# Patient Record
Sex: MALE | Race: WHITE | NOT HISPANIC OR LATINO | Employment: FULL TIME | URBAN - METROPOLITAN AREA
[De-identification: names, ages, dates, MRNs, and addresses within clinical notes are randomized per-mention and may not be internally consistent; named-entity substitution may affect disease eponyms.]

---

## 2018-03-23 ENCOUNTER — CONVERSION ENCOUNTER (OUTPATIENT)
Dept: INTERNAL MEDICINE | Facility: CLINIC | Age: 53
End: 2018-03-23

## 2018-03-23 ENCOUNTER — OFFICE VISIT CONVERTED (OUTPATIENT)
Dept: INTERNAL MEDICINE | Facility: CLINIC | Age: 53
End: 2018-03-23
Attending: INTERNAL MEDICINE

## 2018-09-28 ENCOUNTER — OFFICE VISIT CONVERTED (OUTPATIENT)
Dept: INTERNAL MEDICINE | Facility: CLINIC | Age: 53
End: 2018-09-28
Attending: INTERNAL MEDICINE

## 2019-03-29 ENCOUNTER — HOSPITAL ENCOUNTER (OUTPATIENT)
Dept: OTHER | Facility: HOSPITAL | Age: 54
Discharge: HOME | End: 2019-03-29
Attending: INTERNAL MEDICINE

## 2019-03-29 ENCOUNTER — CONVERSION ENCOUNTER (OUTPATIENT)
Dept: INTERNAL MEDICINE | Facility: CLINIC | Age: 54
End: 2019-03-29

## 2019-03-29 ENCOUNTER — OFFICE VISIT CONVERTED (OUTPATIENT)
Dept: INTERNAL MEDICINE | Facility: CLINIC | Age: 54
End: 2019-03-29
Attending: INTERNAL MEDICINE

## 2019-03-29 LAB
ALBUMIN SERPL-MCNC: 4.2 G/DL (ref 3.5–5)
ALBUMIN/GLOB SERPL: 1.4 {RATIO} (ref 1.4–2.6)
ALP SERPL-CCNC: 45 U/L (ref 56–119)
ALT SERPL-CCNC: 22 U/L (ref 10–40)
ANION GAP SERPL CALC-SCNC: 16 MMOL/L (ref 8–19)
AST SERPL-CCNC: 22 U/L (ref 15–50)
BASOPHILS # BLD AUTO: 0.05 10*3/UL (ref 0–0.2)
BASOPHILS NFR BLD AUTO: 0.8 % (ref 0–3)
BILIRUB SERPL-MCNC: 0.58 MG/DL (ref 0.2–1.3)
BUN SERPL-MCNC: 16 MG/DL (ref 5–25)
BUN/CREAT SERPL: 19 {RATIO} (ref 6–20)
CALCIUM SERPL-MCNC: 9.5 MG/DL (ref 8.7–10.4)
CHLORIDE SERPL-SCNC: 102 MMOL/L (ref 99–111)
CONV ABS IMM GRAN: 0.03 10*3/UL (ref 0–0.2)
CONV CO2: 26 MMOL/L (ref 22–32)
CONV IMMATURE GRAN: 0.5 % (ref 0–1.8)
CONV TOTAL PROTEIN: 7.3 G/DL (ref 6.3–8.2)
CREAT UR-MCNC: 0.83 MG/DL (ref 0.7–1.2)
DEPRECATED RDW RBC AUTO: 42.3 FL (ref 35.1–43.9)
EOSINOPHIL # BLD AUTO: 0.38 10*3/UL (ref 0–0.7)
EOSINOPHIL # BLD AUTO: 6.1 % (ref 0–7)
ERYTHROCYTE [DISTWIDTH] IN BLOOD BY AUTOMATED COUNT: 12.2 % (ref 11.6–14.4)
GFR SERPLBLD BASED ON 1.73 SQ M-ARVRAT: >60 ML/MIN/{1.73_M2}
GLOBULIN UR ELPH-MCNC: 3.1 G/DL (ref 2–3.5)
GLUCOSE SERPL-MCNC: 128 MG/DL (ref 70–99)
HBA1C MFR BLD: 15.6 G/DL (ref 14–18)
HCT VFR BLD AUTO: 44.7 % (ref 42–52)
LYMPHOCYTES # BLD AUTO: 1.31 10*3/UL (ref 1–5)
MCH RBC QN AUTO: 32.9 PG (ref 27–31)
MCHC RBC AUTO-ENTMCNC: 34.9 G/DL (ref 33–37)
MCV RBC AUTO: 94.3 FL (ref 80–96)
MONOCYTES # BLD AUTO: 0.52 10*3/UL (ref 0.2–1.2)
MONOCYTES NFR BLD AUTO: 8.4 % (ref 3–10)
NEUTROPHILS # BLD AUTO: 3.93 10*3/UL (ref 2–8)
NEUTROPHILS NFR BLD AUTO: 63.1 % (ref 30–85)
NRBC CBCN: 0 % (ref 0–0.7)
OSMOLALITY SERPL CALC.SUM OF ELEC: 293 MOSM/KG (ref 273–304)
PLATELET # BLD AUTO: 190 10*3/UL (ref 130–400)
PMV BLD AUTO: 11.6 FL (ref 9.4–12.4)
POTASSIUM SERPL-SCNC: 4.2 MMOL/L (ref 3.5–5.3)
RBC # BLD AUTO: 4.74 10*6/UL (ref 4.7–6.1)
SODIUM SERPL-SCNC: 140 MMOL/L (ref 135–147)
T4 FREE SERPL-MCNC: 1.2 NG/DL (ref 0.9–1.8)
TSH SERPL-ACNC: 3.05 M[IU]/L (ref 0.27–4.2)
VARIANT LYMPHS NFR BLD MANUAL: 21.1 % (ref 20–45)
WBC # BLD AUTO: 6.22 10*3/UL (ref 4.8–10.8)

## 2019-04-29 ENCOUNTER — OFFICE VISIT CONVERTED (OUTPATIENT)
Dept: UROLOGY | Facility: CLINIC | Age: 54
End: 2019-04-29
Attending: UROLOGY

## 2019-04-29 ENCOUNTER — CONVERSION ENCOUNTER (OUTPATIENT)
Dept: SURGERY | Facility: CLINIC | Age: 54
End: 2019-04-29

## 2019-05-31 ENCOUNTER — OFFICE VISIT CONVERTED (OUTPATIENT)
Dept: INTERNAL MEDICINE | Facility: CLINIC | Age: 54
End: 2019-05-31
Attending: INTERNAL MEDICINE

## 2019-06-07 ENCOUNTER — HOSPITAL ENCOUNTER (OUTPATIENT)
Dept: GENERAL RADIOLOGY | Facility: HOSPITAL | Age: 54
Discharge: HOME | End: 2019-06-07
Attending: UROLOGY

## 2019-06-07 LAB
CORTIS AM PEAK SERPL-MCNC: 1.1 UG/DL (ref 6–18.4)
CREAT BLD-MCNC: 0.9 MG/DL (ref 0.6–1.4)
GFR SERPLBLD BASED ON 1.73 SQ M-ARVRAT: >60 ML/MIN/{1.73_M2}

## 2019-06-13 LAB
METANEPH FREE SERPL-SCNC: 13 PG/ML (ref 0–62)
NORMETANEPHRINE, PL: 22 PG/ML (ref 0–145)

## 2019-09-06 ENCOUNTER — CONVERSION ENCOUNTER (OUTPATIENT)
Dept: INTERNAL MEDICINE | Facility: CLINIC | Age: 54
End: 2019-09-06

## 2019-09-06 ENCOUNTER — OFFICE VISIT CONVERTED (OUTPATIENT)
Dept: INTERNAL MEDICINE | Facility: CLINIC | Age: 54
End: 2019-09-06
Attending: INTERNAL MEDICINE

## 2019-11-01 ENCOUNTER — OFFICE VISIT CONVERTED (OUTPATIENT)
Dept: INTERNAL MEDICINE | Facility: CLINIC | Age: 54
End: 2019-11-01
Attending: INTERNAL MEDICINE

## 2019-11-01 ENCOUNTER — HOSPITAL ENCOUNTER (OUTPATIENT)
Dept: OTHER | Facility: HOSPITAL | Age: 54
Discharge: HOME | End: 2019-11-01
Attending: INTERNAL MEDICINE

## 2019-11-01 ENCOUNTER — CONVERSION ENCOUNTER (OUTPATIENT)
Dept: INTERNAL MEDICINE | Facility: CLINIC | Age: 54
End: 2019-11-01

## 2019-11-01 LAB
ALBUMIN SERPL-MCNC: 4.7 G/DL (ref 3.5–5)
ALBUMIN/GLOB SERPL: 1.8 {RATIO} (ref 1.4–2.6)
ALP SERPL-CCNC: 46 U/L (ref 56–119)
ALT SERPL-CCNC: 16 U/L (ref 10–40)
ANION GAP SERPL CALC-SCNC: 19 MMOL/L (ref 8–19)
AST SERPL-CCNC: 16 U/L (ref 15–50)
BASOPHILS # BLD AUTO: 0.05 10*3/UL (ref 0–0.2)
BASOPHILS NFR BLD AUTO: 0.6 % (ref 0–3)
BILIRUB SERPL-MCNC: 0.39 MG/DL (ref 0.2–1.3)
BUN SERPL-MCNC: 17 MG/DL (ref 5–25)
BUN/CREAT SERPL: 19 {RATIO} (ref 6–20)
CALCIUM SERPL-MCNC: 10 MG/DL (ref 8.7–10.4)
CHLORIDE SERPL-SCNC: 103 MMOL/L (ref 99–111)
CHOLEST SERPL-MCNC: 168 MG/DL (ref 107–200)
CHOLEST/HDLC SERPL: 4.7 {RATIO} (ref 3–6)
CONV ABS IMM GRAN: 0.06 10*3/UL (ref 0–0.2)
CONV CO2: 25 MMOL/L (ref 22–32)
CONV IMMATURE GRAN: 0.7 % (ref 0–1.8)
CONV TOTAL PROTEIN: 7.3 G/DL (ref 6.3–8.2)
CREAT UR-MCNC: 0.89 MG/DL (ref 0.7–1.2)
DEPRECATED RDW RBC AUTO: 42.3 FL (ref 35.1–43.9)
EOSINOPHIL # BLD AUTO: 0.29 10*3/UL (ref 0–0.7)
EOSINOPHIL # BLD AUTO: 3.3 % (ref 0–7)
ERYTHROCYTE [DISTWIDTH] IN BLOOD BY AUTOMATED COUNT: 12.1 % (ref 11.6–14.4)
GFR SERPLBLD BASED ON 1.73 SQ M-ARVRAT: >60 ML/MIN/{1.73_M2}
GLOBULIN UR ELPH-MCNC: 2.6 G/DL (ref 2–3.5)
GLUCOSE SERPL-MCNC: 99 MG/DL (ref 70–99)
HCT VFR BLD AUTO: 42.3 % (ref 42–52)
HDLC SERPL-MCNC: 36 MG/DL (ref 40–60)
HGB BLD-MCNC: 14.6 G/DL (ref 14–18)
LDLC SERPL CALC-MCNC: 107 MG/DL (ref 70–100)
LYMPHOCYTES # BLD AUTO: 1.45 10*3/UL (ref 1–5)
LYMPHOCYTES NFR BLD AUTO: 16.7 % (ref 20–45)
MCH RBC QN AUTO: 32.7 PG (ref 27–31)
MCHC RBC AUTO-ENTMCNC: 34.5 G/DL (ref 33–37)
MCV RBC AUTO: 94.8 FL (ref 80–96)
MONOCYTES # BLD AUTO: 0.64 10*3/UL (ref 0.2–1.2)
MONOCYTES NFR BLD AUTO: 7.4 % (ref 3–10)
NEUTROPHILS # BLD AUTO: 6.19 10*3/UL (ref 2–8)
NEUTROPHILS NFR BLD AUTO: 71.3 % (ref 30–85)
NRBC CBCN: 0 % (ref 0–0.7)
OSMOLALITY SERPL CALC.SUM OF ELEC: 296 MOSM/KG (ref 273–304)
PLATELET # BLD AUTO: 210 10*3/UL (ref 130–400)
PMV BLD AUTO: 11.6 FL (ref 9.4–12.4)
POTASSIUM SERPL-SCNC: 4.6 MMOL/L (ref 3.5–5.3)
RBC # BLD AUTO: 4.46 10*6/UL (ref 4.7–6.1)
SODIUM SERPL-SCNC: 142 MMOL/L (ref 135–147)
T4 FREE SERPL-MCNC: 1 NG/DL (ref 0.9–1.8)
TRIGL SERPL-MCNC: 124 MG/DL (ref 40–150)
TSH SERPL-ACNC: 3.6 M[IU]/L (ref 0.27–4.2)
VLDLC SERPL-MCNC: 25 MG/DL (ref 5–37)
WBC # BLD AUTO: 8.68 10*3/UL (ref 4.8–10.8)

## 2020-07-20 ENCOUNTER — HOSPITAL ENCOUNTER (OUTPATIENT)
Dept: OTHER | Facility: HOSPITAL | Age: 55
Discharge: HOME | End: 2020-07-20
Attending: INTERNAL MEDICINE

## 2020-07-20 ENCOUNTER — OFFICE VISIT CONVERTED (OUTPATIENT)
Dept: INTERNAL MEDICINE | Facility: CLINIC | Age: 55
End: 2020-07-20
Attending: NURSE PRACTITIONER

## 2020-07-20 LAB — T4 FREE SERPL-MCNC: 1.2 NG/DL (ref 0.9–1.8)

## 2020-07-21 LAB
ALBUMIN SERPL-MCNC: 4.5 G/DL (ref 3.5–5)
ALBUMIN/GLOB SERPL: 1.7 {RATIO} (ref 1.4–2.6)
ALP SERPL-CCNC: 39 U/L (ref 56–119)
ALT SERPL-CCNC: 15 U/L (ref 10–40)
ANION GAP SERPL CALC-SCNC: 23 MMOL/L (ref 8–19)
AST SERPL-CCNC: 18 U/L (ref 15–50)
BASOPHILS # BLD AUTO: 0.07 10*3/UL (ref 0–0.2)
BASOPHILS NFR BLD AUTO: 1.1 % (ref 0–3)
BILIRUB SERPL-MCNC: 0.45 MG/DL (ref 0.2–1.3)
BUN SERPL-MCNC: 27 MG/DL (ref 5–25)
BUN/CREAT SERPL: 26 {RATIO} (ref 6–20)
CALCIUM SERPL-MCNC: 9.6 MG/DL (ref 8.7–10.4)
CHLORIDE SERPL-SCNC: 110 MMOL/L (ref 99–111)
CHOLEST SERPL-MCNC: 154 MG/DL (ref 107–200)
CHOLEST/HDLC SERPL: 4.3 {RATIO} (ref 3–6)
CONV ABS IMM GRAN: 0.04 10*3/UL (ref 0–0.2)
CONV CO2: 16 MMOL/L (ref 22–32)
CONV IMMATURE GRAN: 0.6 % (ref 0–1.8)
CONV TOTAL PROTEIN: 7.2 G/DL (ref 6.3–8.2)
CREAT UR-MCNC: 1.04 MG/DL (ref 0.7–1.2)
DEPRECATED RDW RBC AUTO: 44.5 FL (ref 35.1–43.9)
EOSINOPHIL # BLD AUTO: 0.49 10*3/UL (ref 0–0.7)
EOSINOPHIL # BLD AUTO: 7.4 % (ref 0–7)
ERYTHROCYTE [DISTWIDTH] IN BLOOD BY AUTOMATED COUNT: 12.2 % (ref 11.6–14.4)
GFR SERPLBLD BASED ON 1.73 SQ M-ARVRAT: >60 ML/MIN/{1.73_M2}
GLOBULIN UR ELPH-MCNC: 2.7 G/DL (ref 2–3.5)
GLUCOSE SERPL-MCNC: 111 MG/DL (ref 70–99)
HCT VFR BLD AUTO: 39 % (ref 42–52)
HDLC SERPL-MCNC: 36 MG/DL (ref 40–60)
HGB BLD-MCNC: 13.2 G/DL (ref 14–18)
LDLC SERPL CALC-MCNC: 94 MG/DL (ref 70–100)
LYMPHOCYTES # BLD AUTO: 1.51 10*3/UL (ref 1–5)
LYMPHOCYTES NFR BLD AUTO: 22.9 % (ref 20–45)
MCH RBC QN AUTO: 33.4 PG (ref 27–31)
MCHC RBC AUTO-ENTMCNC: 33.8 G/DL (ref 33–37)
MCV RBC AUTO: 98.7 FL (ref 80–96)
MONOCYTES # BLD AUTO: 0.51 10*3/UL (ref 0.2–1.2)
MONOCYTES NFR BLD AUTO: 7.7 % (ref 3–10)
NEUTROPHILS # BLD AUTO: 3.97 10*3/UL (ref 2–8)
NEUTROPHILS NFR BLD AUTO: 60.3 % (ref 30–85)
NRBC CBCN: 0 % (ref 0–0.7)
OSMOLALITY SERPL CALC.SUM OF ELEC: 302 MOSM/KG (ref 273–304)
PLATELET # BLD AUTO: 211 10*3/UL (ref 130–400)
PMV BLD AUTO: 11.9 FL (ref 9.4–12.4)
POTASSIUM SERPL-SCNC: 5.6 MMOL/L (ref 3.5–5.3)
PSA SERPL-MCNC: 0.86 NG/ML (ref 0–4)
RBC # BLD AUTO: 3.95 10*6/UL (ref 4.7–6.1)
SODIUM SERPL-SCNC: 143 MMOL/L (ref 135–147)
TRIGL SERPL-MCNC: 121 MG/DL (ref 40–150)
TSH SERPL-ACNC: 4.25 M[IU]/L (ref 0.27–4.2)
VLDLC SERPL-MCNC: 24 MG/DL (ref 5–37)
WBC # BLD AUTO: 6.59 10*3/UL (ref 4.8–10.8)

## 2020-07-24 ENCOUNTER — HOSPITAL ENCOUNTER (OUTPATIENT)
Dept: OTHER | Facility: HOSPITAL | Age: 55
Discharge: HOME | End: 2020-07-24
Attending: NURSE PRACTITIONER

## 2020-07-24 LAB — VIT B12 SERPL-MCNC: 564 PG/ML (ref 211–911)

## 2020-07-25 LAB
EST. AVERAGE GLUCOSE BLD GHB EST-MCNC: 105 MG/DL
HBA1C MFR BLD: 5.3 % (ref 3.5–5.7)
IRON SATN MFR SERPL: 38 % (ref 20–55)
IRON SERPL-MCNC: 132 UG/DL (ref 70–180)
TIBC SERPL-MCNC: 346 UG/DL (ref 245–450)
TRANSFERRIN SERPL-MCNC: 242 MG/DL (ref 215–365)

## 2020-08-08 ENCOUNTER — HOSPITAL ENCOUNTER (OUTPATIENT)
Dept: OTHER | Facility: HOSPITAL | Age: 55
Discharge: HOME | End: 2020-08-08
Attending: NURSE PRACTITIONER

## 2020-08-08 LAB
BASOPHILS # BLD AUTO: 0.06 10*3/UL (ref 0–0.2)
BASOPHILS NFR BLD AUTO: 1 % (ref 0–3)
CONV ABS IMM GRAN: 0.04 10*3/UL (ref 0–0.2)
CONV IMMATURE GRAN: 0.6 % (ref 0–1.8)
DEPRECATED RDW RBC AUTO: 44.7 FL (ref 35.1–43.9)
EOSINOPHIL # BLD AUTO: 0.35 10*3/UL (ref 0–0.7)
EOSINOPHIL # BLD AUTO: 5.6 % (ref 0–7)
ERYTHROCYTE [DISTWIDTH] IN BLOOD BY AUTOMATED COUNT: 12.9 % (ref 11.6–14.4)
HCT VFR BLD AUTO: 35.6 % (ref 42–52)
HGB BLD-MCNC: 12 G/DL (ref 14–18)
LYMPHOCYTES # BLD AUTO: 1.46 10*3/UL (ref 1–5)
LYMPHOCYTES NFR BLD AUTO: 23.3 % (ref 20–45)
MCH RBC QN AUTO: 32.6 PG (ref 27–31)
MCHC RBC AUTO-ENTMCNC: 33.7 G/DL (ref 33–37)
MCV RBC AUTO: 96.7 FL (ref 80–96)
MONOCYTES # BLD AUTO: 0.57 10*3/UL (ref 0.2–1.2)
MONOCYTES NFR BLD AUTO: 9.1 % (ref 3–10)
NEUTROPHILS # BLD AUTO: 3.79 10*3/UL (ref 2–8)
NEUTROPHILS NFR BLD AUTO: 60.4 % (ref 30–85)
NRBC CBCN: 0 % (ref 0–0.7)
PLATELET # BLD AUTO: 195 10*3/UL (ref 130–400)
PMV BLD AUTO: 10.6 FL (ref 9.4–12.4)
RBC # BLD AUTO: 3.68 10*6/UL (ref 4.7–6.1)
T4 FREE SERPL-MCNC: 1 NG/DL (ref 0.9–1.8)
TSH SERPL-ACNC: 4.16 M[IU]/L (ref 0.27–4.2)
WBC # BLD AUTO: 6.27 10*3/UL (ref 4.8–10.8)

## 2021-05-13 NOTE — PROGRESS NOTES
"   Progress Note      Patient Name: Edgar Owens   Patient ID: [id number]   Sex: Male   YOB: 1965    Primary Care Provider: Jennifer Lyon MD   Referring Provider: Jennifer Lyon MD    Visit Date: July 20, 2020    Provider: ALFREDA Domingo   Location: [facility]   Location Address: [address] [place] [address]  [id number]   Location Phone: [phone]          Chief Complaint  · \"I am just following up on my blood pressure\"  · \"I want to talk about the supplements I have been taking\"      History Of Present Illness  Edgar Owens is a 54 year old /White male who presents for evaluation and treatment of:      Follow-up visit    Chronic management    Hypertension doing really well, 1teens-120/70-80s. Patient reports that he is taking his blood pressure once a day. Denies cp, shortness of breath, edema. 126/64 manal pressure.     HypothyroidismSynthroid 75 mcg. Denies hypo/hyper symptoms at this time.     Colonoscopy-  sent last year, patient up to date: seen  thinks it was up to date until 2022.     Labs last November.     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 Hydrating with water but no electrolyte replacements.     Has been taking 's bionutients as some natural replacements. Patient is taking fish oil, probiotics, chelated magnesium, d-ribose, CoQ10 and miltiple vitamin and mineral powder.       Past Medical History  Disease Name Date Onset Notes   Hypertension --  --    Hypothyroidism --  --    Reflux --  --          Past Surgical History  Procedure Name Date Notes   Adrenalectomy --  --    Hemorrhoidectomy --  --          Medication List  Name Date Started Instructions   Co Q-10 oral  --    D-Ribose  --    Fish Oil oral  --    lisinopril 40 mg oral tablet 07/20/2020 take 1 tablet (40 mg) by oral route once daily for 90 days   magnesium 250 mg oral tablet  take 1 tablet by oral route " "daily   Multiple Vitamin and Mineral Powder  --    Probiotic oral  --    Synthroid 75 mcg oral tablet 07/20/2020 TAKE ONE TABLET BY MOUTH DAILY         Allergy List  Allergen Name Date Reaction Notes   NO KNOWN DRUG ALLERGIES --  --  --        Allergies Reconciled  Family Medical History  Disease Name Relative/Age Notes   Diabetes, unspecified type Father/  Mother/   Mother; Father   Prostate cancer  Uncle (maternal)   Family history of colon cancer Mother/50s   Mother/50s         Social History  Finding Status Start/Stop Quantity Notes   Alcohol Current some day 0/0 --  drinks weekly; beer   Caffeine Current every day 0/0 --  drinks regularly; 3-4 times per day   Tobacco Former 20/0 --  07/08/2016 - Stopped smoking march 2016 current everyday smoker; started smoking at age 18; smoked 20 cigarette(s) per day         Review of Systems  · Constitutional  o Denies  o : fever, fatigue, weight loss, weight gain  · HENT  o Denies  o : headaches, sinus pain, nasal congestion, nasal discharge  · Cardiovascular  o Denies  o : lower extremity edema, claudication, chest pressure, palpitations  · Respiratory  o Denies  o : shortness of breath, wheezing, frequent cough, hemoptysis, dyspnea on exertion  · Gastrointestinal  o Denies  o : nausea, vomiting, diarrhea, constipation, abdominal pain  · Neurologic  o Admits  o : dizziness  o Denies  o : altered mental status, muscular weakness, incoordination  · Endocrine  o Denies  o : loss of hair, constipation, cold intolerance, heat intolerance, weight gain, weight loss      Vitals  Date Time BP Position Site L\R Cuff Size HR RR TEMP (F) WT  HT  BMI kg/m2 BSA m2 O2 Sat HC       09/06/2019 09:11 /82 Sitting    75 - R  97.7 197lbs 6oz 5'  4\" 33.88 2.01 98 %    09/06/2019 10:05 /85 Sitting               11/01/2019 10:15 /84 Sitting    83 - R  98 198lbs 2oz 5'  4\" 34.01 2.01 98 %    07/20/2020 09:24 /80 Sitting    96 - R  98 194lbs 8oz 5'  4\" 33.39 2 97 %  "         Physical Examination  · Constitutional  o Appearance  o : no acute distress, well-nourished  · Head and Face  o Head  o :   § Inspection  § : atraumatic, normocephalic  · Eyes  o Eyes  o : extraocular movements intact, no scleral icterus, no conjunctival injection  · Ears, Nose, Mouth and Throat  o Ears  o :   § External Ears  § : normal  § Otoscopic Examination  § : tympanic membrane appearance within normal limits bilaterally  o Nose  o :   § Intranasal Exam  § : nares patent  o Oral Cavity  o :   § Oral Mucosa  § : moist mucous membranes  o Throat  o :   § Oropharynx  § : no inflammation or lesions present, tonsils within normal limits  · Neck  o Thyroid  o : gland size normal, nontender, no nodules or masses present on palpation, symmetric  · Respiratory  o Respiratory Effort  o : breathing comfortably, symmetric chest rise  o Auscultation of Lungs  o : clear to asculatation bilaterally, no wheezes, rales, or rhonchii  · Cardiovascular  o Heart  o :   § Auscultation of Heart  § : regular rate and rhythm, no murmurs, rubs, or gallops  o Peripheral Vascular System  o :   § Extremities  § : no edema  · Gastrointestinal  o Abdominal Examination  o :   § Abdomen  § : bowel sounds present, non-distended, non-tender  · Lymphatic  o Neck  o : no lymphadenopathy present  o Supraclavicular Nodes  o : no supraclavicular nodes  · Skin and Subcutaneous Tissue  o General Inspection  o : no lesions present, no areas of discoloration, skin turgor normal  · Neurologic  o Mental Status Examination  o :   § Orientation  § : grossly oriented to person, place and time  o Gait and Station  o :   § Gait Screening  § : normal gait  · Psychiatric  o General  o : normal mood and affect          Assessment  · Hypertension     401.9/I10  Well-controlled at this time. Continue current regimen. Continue taking blood pressure and write it down bring a blood pressure log with him to the next  visit.  · Reflux     530.81/K21.9  · Hypothyroidism     244.9/E03.9  Will check labs at this time.  · Screening for depression     V79.0/Z13.89  PHQ 9 score 0.  · Screening for lipid disorders     V77.91/Z13.220  Will check labs.  · Screening for colon cancer     V76.51/Z12.11  Patient believes he has had his colonoscopy performed will try to obtain records from Dr. Saldana.    Problems Reconciled  Plan  · Orders  o ACO-18: Negative screen for clinical depression using a standardized tool () - V79.0/Z13.89 - 07/20/2020   PHQ9: score 0   o Male Physical Primary Care Panel (CMP, CBC, TSH, Lipid, PSA) [facility] (87591, 17026, 33272, 59768, 85574, ) - 530.81/K21.9, 401.9/I10, 244.9/E03.9, V77.91/Z13.220 - 07/20/2020   fasting at this time.    Can leave voicemail with lab results   o Thyroid Profile (48564, 89873, THYII) - 244.9/E03.9 - 07/20/2020  o ACO-39: Current medications updated and reviewed () - - 07/20/2020  o ACO-18: Negative screen for clinical depression using a standardized tool () - - 07/20/2020  · Medications  o lisinopril 40 mg oral tablet   SIG: take 1 tablet (40 mg) by oral route once daily for 90 days   DISP: (90) tablets with 1 refills  Refilled on 07/20/2020     o Synthroid 75 mcg oral tablet   SIG: TAKE ONE TABLET BY MOUTH DAILY   DISP: (90) Tablet with 1 refills  Refilled on 07/20/2020     o Medications have been Reconciled  o Transition of Care or Provider Policy  · Instructions  o Depression Screen completed and scanned into the EMR under the designated folder within the patient's documents.  o Today's PHQ-9 result is _0__  o Patient was educated/instructed on their diagnosis, treatment and medications prior to discharge from the clinic today.  o Call the office with any concerns or questions.  · Disposition  o Call or Return if symptoms worsen or persist.  o Meds sent to pharmacy  o 6 month follow up  o Dr. Lyon patient            Electronically Signed by: ALFREDA Domingo  -Author on July 20, 2020 10:52:47 AM

## 2021-05-15 VITALS
DIASTOLIC BLOOD PRESSURE: 82 MMHG | HEIGHT: 64 IN | SYSTOLIC BLOOD PRESSURE: 150 MMHG | HEART RATE: 75 BPM | BODY MASS INDEX: 33.7 KG/M2 | SYSTOLIC BLOOD PRESSURE: 148 MMHG | OXYGEN SATURATION: 98 % | TEMPERATURE: 97.7 F | WEIGHT: 197.37 LBS | DIASTOLIC BLOOD PRESSURE: 85 MMHG

## 2021-05-15 VITALS
HEIGHT: 64 IN | HEART RATE: 70 BPM | WEIGHT: 200 LBS | BODY MASS INDEX: 34.15 KG/M2 | BODY MASS INDEX: 34.19 KG/M2 | HEIGHT: 64 IN | RESPIRATION RATE: 16 BRPM | WEIGHT: 200.25 LBS

## 2021-05-15 VITALS
BODY MASS INDEX: 33.83 KG/M2 | HEART RATE: 83 BPM | TEMPERATURE: 98 F | HEIGHT: 64 IN | SYSTOLIC BLOOD PRESSURE: 148 MMHG | DIASTOLIC BLOOD PRESSURE: 84 MMHG | WEIGHT: 198.12 LBS | OXYGEN SATURATION: 98 %

## 2021-05-15 VITALS
RESPIRATION RATE: 16 BRPM | DIASTOLIC BLOOD PRESSURE: 88 MMHG | OXYGEN SATURATION: 99 % | HEIGHT: 64 IN | HEART RATE: 92 BPM | TEMPERATURE: 97.9 F | BODY MASS INDEX: 34.15 KG/M2 | WEIGHT: 200 LBS | SYSTOLIC BLOOD PRESSURE: 151 MMHG

## 2021-05-15 VITALS
HEIGHT: 64 IN | WEIGHT: 197 LBS | SYSTOLIC BLOOD PRESSURE: 156 MMHG | HEART RATE: 88 BPM | DIASTOLIC BLOOD PRESSURE: 88 MMHG | RESPIRATION RATE: 16 BRPM | OXYGEN SATURATION: 99 % | TEMPERATURE: 97.9 F | BODY MASS INDEX: 33.63 KG/M2

## 2021-05-15 VITALS
HEIGHT: 64 IN | SYSTOLIC BLOOD PRESSURE: 132 MMHG | HEART RATE: 96 BPM | WEIGHT: 194.5 LBS | OXYGEN SATURATION: 97 % | BODY MASS INDEX: 33.2 KG/M2 | TEMPERATURE: 98 F | DIASTOLIC BLOOD PRESSURE: 80 MMHG

## 2021-05-16 VITALS
HEIGHT: 64 IN | BODY MASS INDEX: 33.89 KG/M2 | WEIGHT: 198.5 LBS | OXYGEN SATURATION: 100 % | SYSTOLIC BLOOD PRESSURE: 150 MMHG | RESPIRATION RATE: 16 BRPM | DIASTOLIC BLOOD PRESSURE: 90 MMHG | HEART RATE: 88 BPM | TEMPERATURE: 97.2 F

## 2021-05-16 VITALS
DIASTOLIC BLOOD PRESSURE: 76 MMHG | SYSTOLIC BLOOD PRESSURE: 136 MMHG | TEMPERATURE: 97.7 F | BODY MASS INDEX: 34.91 KG/M2 | HEIGHT: 64 IN | RESPIRATION RATE: 16 BRPM | OXYGEN SATURATION: 98 % | WEIGHT: 204.5 LBS | HEART RATE: 98 BPM

## 2021-07-13 RX ORDER — LISINOPRIL 40 MG/1
TABLET ORAL
COMMUNITY
Start: 2021-01-25 | End: 2021-07-13 | Stop reason: SDUPTHER

## 2021-07-13 RX ORDER — LEVOTHYROXINE SODIUM 0.07 MG/1
TABLET ORAL
COMMUNITY
Start: 2021-01-25 | End: 2021-07-13 | Stop reason: SDUPTHER

## 2021-07-13 RX ORDER — LEVOTHYROXINE SODIUM 0.07 MG/1
75 TABLET ORAL DAILY
Qty: 90 TABLET | Refills: 0 | Status: SHIPPED | OUTPATIENT
Start: 2021-07-13 | End: 2021-11-02 | Stop reason: SDUPTHER

## 2021-07-13 RX ORDER — LISINOPRIL 40 MG/1
40 TABLET ORAL DAILY
Qty: 90 TABLET | Refills: 0 | Status: SHIPPED | OUTPATIENT
Start: 2021-07-13 | End: 2021-11-02

## 2021-10-22 ENCOUNTER — TELEPHONE (OUTPATIENT)
Dept: INTERNAL MEDICINE | Facility: CLINIC | Age: 56
End: 2021-10-22

## 2021-11-02 ENCOUNTER — OFFICE VISIT (OUTPATIENT)
Dept: INTERNAL MEDICINE | Facility: CLINIC | Age: 56
End: 2021-11-02

## 2021-11-02 VITALS
WEIGHT: 191 LBS | TEMPERATURE: 98.6 F | SYSTOLIC BLOOD PRESSURE: 152 MMHG | DIASTOLIC BLOOD PRESSURE: 90 MMHG | BODY MASS INDEX: 32.61 KG/M2 | RESPIRATION RATE: 14 BRPM | OXYGEN SATURATION: 97 % | HEART RATE: 96 BPM | HEIGHT: 64 IN

## 2021-11-02 DIAGNOSIS — Z11.59 NEED FOR HEPATITIS C SCREENING TEST: ICD-10-CM

## 2021-11-02 DIAGNOSIS — E03.8 HYPOTHYROIDISM DUE TO HASHIMOTO'S THYROIDITIS: ICD-10-CM

## 2021-11-02 DIAGNOSIS — Z12.5 SCREENING PSA (PROSTATE SPECIFIC ANTIGEN): ICD-10-CM

## 2021-11-02 DIAGNOSIS — Z13.220 SCREENING, LIPID: Primary | ICD-10-CM

## 2021-11-02 DIAGNOSIS — Z71.85 VACCINE COUNSELING: ICD-10-CM

## 2021-11-02 DIAGNOSIS — I10 PRIMARY HYPERTENSION: ICD-10-CM

## 2021-11-02 DIAGNOSIS — E06.3 HYPOTHYROIDISM DUE TO HASHIMOTO'S THYROIDITIS: ICD-10-CM

## 2021-11-02 PROBLEM — E03.9 HYPOTHYROIDISM: Status: ACTIVE | Noted: 2021-11-02

## 2021-11-02 PROBLEM — K21.9 ESOPHAGEAL REFLUX: Status: ACTIVE | Noted: 2021-11-02

## 2021-11-02 LAB
ALBUMIN SERPL-MCNC: 4.5 G/DL (ref 3.5–5.2)
ALBUMIN/GLOB SERPL: 1.8 G/DL
ALP SERPL-CCNC: 44 U/L (ref 39–117)
ALT SERPL W P-5'-P-CCNC: 16 U/L (ref 1–41)
ANION GAP SERPL CALCULATED.3IONS-SCNC: 5.7 MMOL/L (ref 5–15)
AST SERPL-CCNC: 16 U/L (ref 1–40)
BASOPHILS # BLD AUTO: 0.06 10*3/MM3 (ref 0–0.2)
BASOPHILS NFR BLD AUTO: 1 % (ref 0–1.5)
BILIRUB SERPL-MCNC: 0.6 MG/DL (ref 0–1.2)
BUN SERPL-MCNC: 16 MG/DL (ref 6–20)
BUN/CREAT SERPL: 16 (ref 7–25)
CALCIUM SPEC-SCNC: 9.8 MG/DL (ref 8.6–10.5)
CHLORIDE SERPL-SCNC: 105 MMOL/L (ref 98–107)
CHOLEST SERPL-MCNC: 190 MG/DL (ref 0–200)
CO2 SERPL-SCNC: 28.3 MMOL/L (ref 22–29)
CREAT SERPL-MCNC: 1 MG/DL (ref 0.76–1.27)
DEPRECATED RDW RBC AUTO: 41.8 FL (ref 37–54)
EOSINOPHIL # BLD AUTO: 0.31 10*3/MM3 (ref 0–0.4)
EOSINOPHIL NFR BLD AUTO: 5.4 % (ref 0.3–6.2)
ERYTHROCYTE [DISTWIDTH] IN BLOOD BY AUTOMATED COUNT: 12.1 % (ref 12.3–15.4)
GFR SERPL CREATININE-BSD FRML MDRD: 77 ML/MIN/1.73
GLOBULIN UR ELPH-MCNC: 2.5 GM/DL
GLUCOSE SERPL-MCNC: 92 MG/DL (ref 65–99)
HCT VFR BLD AUTO: 43.4 % (ref 37.5–51)
HDLC SERPL-MCNC: 45 MG/DL (ref 40–60)
HGB BLD-MCNC: 15.1 G/DL (ref 13–17.7)
IMM GRANULOCYTES # BLD AUTO: 0.03 10*3/MM3 (ref 0–0.05)
IMM GRANULOCYTES NFR BLD AUTO: 0.5 % (ref 0–0.5)
LDLC SERPL CALC-MCNC: 129 MG/DL (ref 0–100)
LDLC/HDLC SERPL: 2.83 {RATIO}
LYMPHOCYTES # BLD AUTO: 1.5 10*3/MM3 (ref 0.7–3.1)
LYMPHOCYTES NFR BLD AUTO: 26.2 % (ref 19.6–45.3)
MCH RBC QN AUTO: 33.1 PG (ref 26.6–33)
MCHC RBC AUTO-ENTMCNC: 34.8 G/DL (ref 31.5–35.7)
MCV RBC AUTO: 95.2 FL (ref 79–97)
MONOCYTES # BLD AUTO: 0.56 10*3/MM3 (ref 0.1–0.9)
MONOCYTES NFR BLD AUTO: 9.8 % (ref 5–12)
NEUTROPHILS NFR BLD AUTO: 3.27 10*3/MM3 (ref 1.7–7)
NEUTROPHILS NFR BLD AUTO: 57.1 % (ref 42.7–76)
NRBC BLD AUTO-RTO: 0 /100 WBC (ref 0–0.2)
PLATELET # BLD AUTO: 181 10*3/MM3 (ref 140–450)
PMV BLD AUTO: 11.1 FL (ref 6–12)
POTASSIUM SERPL-SCNC: 4.6 MMOL/L (ref 3.5–5.2)
PROT SERPL-MCNC: 7 G/DL (ref 6–8.5)
PSA SERPL-MCNC: 1.12 NG/ML (ref 0–4)
RBC # BLD AUTO: 4.56 10*6/MM3 (ref 4.14–5.8)
SODIUM SERPL-SCNC: 139 MMOL/L (ref 136–145)
T4 FREE SERPL-MCNC: 1.25 NG/DL (ref 0.93–1.7)
TRIGL SERPL-MCNC: 88 MG/DL (ref 0–150)
TSH SERPL DL<=0.05 MIU/L-ACNC: 4.32 UIU/ML (ref 0.27–4.2)
VLDLC SERPL-MCNC: 16 MG/DL (ref 5–40)
WBC # BLD AUTO: 5.73 10*3/MM3 (ref 3.4–10.8)

## 2021-11-02 PROCEDURE — 85025 COMPLETE CBC W/AUTO DIFF WBC: CPT | Performed by: INTERNAL MEDICINE

## 2021-11-02 PROCEDURE — 80061 LIPID PANEL: CPT | Performed by: INTERNAL MEDICINE

## 2021-11-02 PROCEDURE — 80053 COMPREHEN METABOLIC PANEL: CPT | Performed by: INTERNAL MEDICINE

## 2021-11-02 PROCEDURE — 84439 ASSAY OF FREE THYROXINE: CPT | Performed by: INTERNAL MEDICINE

## 2021-11-02 PROCEDURE — 99214 OFFICE O/P EST MOD 30 MIN: CPT | Performed by: INTERNAL MEDICINE

## 2021-11-02 PROCEDURE — 36415 COLL VENOUS BLD VENIPUNCTURE: CPT | Performed by: INTERNAL MEDICINE

## 2021-11-02 PROCEDURE — 86803 HEPATITIS C AB TEST: CPT | Performed by: INTERNAL MEDICINE

## 2021-11-02 PROCEDURE — G0103 PSA SCREENING: HCPCS | Performed by: INTERNAL MEDICINE

## 2021-11-02 PROCEDURE — 84443 ASSAY THYROID STIM HORMONE: CPT | Performed by: INTERNAL MEDICINE

## 2021-11-02 RX ORDER — TERBINAFINE HYDROCHLORIDE 250 MG/1
250 TABLET ORAL DAILY
Qty: 90 TABLET | Refills: 0 | Status: SHIPPED | OUTPATIENT
Start: 2021-11-02 | End: 2022-01-14

## 2021-11-02 RX ORDER — LEVOTHYROXINE SODIUM 0.07 MG/1
75 TABLET ORAL DAILY
Qty: 90 TABLET | Refills: 0 | Status: SHIPPED | OUTPATIENT
Start: 2021-11-02 | End: 2022-01-14 | Stop reason: SDUPTHER

## 2021-11-02 RX ORDER — TELMISARTAN AND HYDROCHLORTHIAZIDE 80; 12.5 MG/1; MG/1
1 TABLET ORAL DAILY
Qty: 90 TABLET | Refills: 1 | Status: SHIPPED | OUTPATIENT
Start: 2021-11-02 | End: 2022-04-27 | Stop reason: SDUPTHER

## 2021-11-02 RX ORDER — IBUPROFEN 800 MG/1
800 TABLET ORAL EVERY 6 HOURS PRN
Qty: 90 TABLET | Refills: 0 | Status: SHIPPED | OUTPATIENT
Start: 2021-11-02

## 2021-11-02 NOTE — PROGRESS NOTES
"Chief Complaint  Follow-up and Hypothyroidism    Subjective          Edgar Owens presents to [facility]  History of Present Illness    HTN-  Hasn't really been checking bp at home  Has noticed blurry vision at time, usually in the evenings  No chest pain  No trouble breathing    Still taking thyroid meds and feels well on this    Left index finger  Not sure if he hit it on something, but states that it is possible  Nail is bent and discolored    Objective   Vital Signs:   /90   Pulse 96   Temp 98.6 °F (37 °C)   Resp 14   Ht 162.6 cm (64\")   Wt 86.6 kg (191 lb)   SpO2 97%   BMI 32.79 kg/m²     Physical Exam  Vitals reviewed.   Constitutional:       Appearance: Normal appearance. He is well-developed.   HENT:      Head: Normocephalic and atraumatic.      Right Ear: External ear normal.      Left Ear: External ear normal.   Eyes:      Conjunctiva/sclera: Conjunctivae normal.      Pupils: Pupils are equal, round, and reactive to light.   Cardiovascular:      Rate and Rhythm: Normal rate and regular rhythm.      Heart sounds: No murmur heard.  No friction rub. No gallop.    Pulmonary:      Effort: Pulmonary effort is normal.      Breath sounds: Normal breath sounds. No wheezing or rhonchi.   Skin:     General: Skin is warm and dry.      Comments: Left index finger with thickening and slight yellowish discoloration   Neurological:      Mental Status: He is alert and oriented to person, place, and time.      Cranial Nerves: No cranial nerve deficit.   Psychiatric:         Mood and Affect: Affect normal.         Behavior: Behavior normal.         Thought Content: Thought content normal.        Result Review :          Procedures      Assessment and Plan    Diagnoses and all orders for this visit:    1. Screening, lipid (Primary)  -     Lipid Panel    2. Hypothyroidism due to Hashimoto's thyroiditis  Comments:  will check labs and adjust meds as needed based " on results  Orders:  -     TSH  -     T4, Free    3. Primary hypertension  Comments:  will adjust meds and check labs  Orders:  -     Comprehensive Metabolic Panel  -     CBC & Differential    4. Need for hepatitis C screening test  -     Hepatitis C antibody; Future  -     Hepatitis C antibody    5. Screening PSA (prostate specific antigen)  -     PSA SCREENING; Future  -     PSA SCREENING    6. Vaccine counseling  Comments:  states he will think about getting the covid vaccine    Other orders  -     terbinafine (lamiSIL) 250 MG tablet; Take 1 tablet by mouth Daily.  Dispense: 90 tablet; Refill: 0  -     telmisartan-hydrochlorothiazide (MICARDIS HCT) 80-12.5 MG per tablet; Take 1 tablet by mouth Daily.  Dispense: 90 tablet; Refill: 1  -     ibuprofen (ADVIL,MOTRIN) 800 MG tablet; Take 1 tablet by mouth Every 6 (Six) Hours As Needed for Mild Pain .  Dispense: 90 tablet; Refill: 0  -     levothyroxine (Synthroid) 75 MCG tablet; Take 1 tablet by mouth Daily.  Dispense: 90 tablet; Refill: 0              Follow Up   Return in about 6 weeks (around 12/14/2021).  Patient was given instructions and counseling regarding his condition or for health maintenance advice. Please see specific information pulled into the AVS if appropriate.

## 2021-11-03 LAB — HCV AB SER DONR QL: NORMAL

## 2021-11-10 ENCOUNTER — TELEPHONE (OUTPATIENT)
Dept: INTERNAL MEDICINE | Facility: CLINIC | Age: 56
End: 2021-11-10

## 2021-11-10 NOTE — TELEPHONE ENCOUNTER
Caller: Edgar Owens    Relationship: Self    Best call back number: [phone]    Caller requesting test results: SELF    What test was performed: BLOOD WORK    When was the test performed: 11/2/21    Where was the test performed: [facility]

## 2021-12-13 ENCOUNTER — OFFICE VISIT (OUTPATIENT)
Dept: INTERNAL MEDICINE | Facility: CLINIC | Age: 56
End: 2021-12-13

## 2021-12-13 VITALS
HEART RATE: 68 BPM | DIASTOLIC BLOOD PRESSURE: 80 MMHG | WEIGHT: 193.4 LBS | BODY MASS INDEX: 33.02 KG/M2 | TEMPERATURE: 98 F | OXYGEN SATURATION: 99 % | HEIGHT: 64 IN | SYSTOLIC BLOOD PRESSURE: 150 MMHG | RESPIRATION RATE: 14 BRPM

## 2021-12-13 DIAGNOSIS — E06.3 HYPOTHYROIDISM DUE TO HASHIMOTO'S THYROIDITIS: ICD-10-CM

## 2021-12-13 DIAGNOSIS — I10 PRIMARY HYPERTENSION: Primary | ICD-10-CM

## 2021-12-13 DIAGNOSIS — E03.8 HYPOTHYROIDISM DUE TO HASHIMOTO'S THYROIDITIS: ICD-10-CM

## 2021-12-13 DIAGNOSIS — B35.1 NAIL FUNGUS: ICD-10-CM

## 2021-12-13 PROCEDURE — 99214 OFFICE O/P EST MOD 30 MIN: CPT | Performed by: INTERNAL MEDICINE

## 2021-12-13 RX ORDER — AMLODIPINE BESYLATE 2.5 MG/1
2.5 TABLET ORAL DAILY
Qty: 90 TABLET | Refills: 1 | Status: SHIPPED | OUTPATIENT
Start: 2021-12-13 | End: 2022-01-14

## 2021-12-13 NOTE — PROGRESS NOTES
"Chief Complaint  Follow-up (Blood work) and blood pressure      Subjective          Edgar Owens presents to [facility]  History of Present Illness    HTN-  States that he feels like his blood pressure has been doing better  He feels A LOT better  He feels like he has more energy  No chest pain  Vision is better, but he got new glasses    Feels like thyroid is doing well    Objective   Vital Signs:   /80   Pulse 68   Temp 98 °F (36.7 °C) (Temporal)   Resp 14   Ht 162.6 cm (64\")   Wt 87.7 kg (193 lb 6.4 oz)   SpO2 99%   BMI 33.20 kg/m²     Physical Exam  Vitals reviewed.   Constitutional:       Appearance: Normal appearance. He is well-developed.   HENT:      Head: Normocephalic and atraumatic.      Right Ear: External ear normal.      Left Ear: External ear normal.   Eyes:      Conjunctiva/sclera: Conjunctivae normal.      Pupils: Pupils are equal, round, and reactive to light.   Cardiovascular:      Rate and Rhythm: Normal rate and regular rhythm.      Heart sounds: No murmur heard.  No friction rub. No gallop.    Pulmonary:      Effort: Pulmonary effort is normal.      Breath sounds: Normal breath sounds. No wheezing or rhonchi.   Skin:     General: Skin is warm and dry.      Comments: Nail deformity grown out to tip of nail   Neurological:      Mental Status: He is alert and oriented to person, place, and time.      Cranial Nerves: No cranial nerve deficit.   Psychiatric:         Mood and Affect: Affect normal.         Behavior: Behavior normal.         Thought Content: Thought content normal.        Result Review :     Common labs    Common Labsle 11/2/21 11/2/21 11/2/21 11/2/21    1253 1253 1253 1253   Glucose  92     BUN  16     Creatinine  1.00     eGFR Non African Am  77     Sodium  139     Potassium  4.6     Chloride  105     Calcium  9.8     Albumin  4.50     Total Bilirubin  0.6     Alkaline Phosphatase  44     AST (SGOT)  16     ALT " (SGPT)  16     WBC 5.73      Hemoglobin 15.1      Hematocrit 43.4      Platelets 181      Total Cholesterol   190    Triglycerides   88    HDL Cholesterol   45    LDL Cholesterol    129 (A)    PSA    1.120   (A) Abnormal value               Procedures      Assessment and Plan    Diagnoses and all orders for this visit:    1. Primary hypertension (Primary)  Comments:  will add amlodipine; otherwise doing much better, cont to monitor    2. Hypothyroidism due to Hashimoto's thyroiditis  Assessment & Plan:  Doing well, cont curren tmeds      3. Nail fungus  Comments:  almost completely healed, cont meds stop once healed    Other orders  -     amLODIPine (NORVASC) 2.5 MG tablet; Take 1 tablet by mouth Daily.  Dispense: 90 tablet; Refill: 1            Follow Up   Return in about 1 month (around 1/13/2022) for would like to do next appt on a Friday.  Patient was given instructions and counseling regarding his condition or for health maintenance advice. Please see specific information pulled into the AVS if appropriate.

## 2022-01-14 ENCOUNTER — TELEPHONE (OUTPATIENT)
Dept: UROLOGY | Facility: CLINIC | Age: 57
End: 2022-01-14

## 2022-01-14 ENCOUNTER — OFFICE VISIT (OUTPATIENT)
Dept: INTERNAL MEDICINE | Facility: CLINIC | Age: 57
End: 2022-01-14

## 2022-01-14 VITALS
HEIGHT: 64 IN | TEMPERATURE: 96.8 F | OXYGEN SATURATION: 98 % | HEART RATE: 68 BPM | WEIGHT: 197.8 LBS | DIASTOLIC BLOOD PRESSURE: 78 MMHG | SYSTOLIC BLOOD PRESSURE: 130 MMHG | BODY MASS INDEX: 33.77 KG/M2 | RESPIRATION RATE: 18 BRPM

## 2022-01-14 DIAGNOSIS — I10 PRIMARY HYPERTENSION: Primary | ICD-10-CM

## 2022-01-14 DIAGNOSIS — E27.8 ADRENAL MASS: ICD-10-CM

## 2022-01-14 DIAGNOSIS — E03.8 HYPOTHYROIDISM DUE TO HASHIMOTO'S THYROIDITIS: ICD-10-CM

## 2022-01-14 DIAGNOSIS — E06.3 HYPOTHYROIDISM DUE TO HASHIMOTO'S THYROIDITIS: ICD-10-CM

## 2022-01-14 LAB
ALBUMIN SERPL-MCNC: 4.6 G/DL (ref 3.5–5.2)
ALBUMIN/GLOB SERPL: 1.9 G/DL
ALP SERPL-CCNC: 44 U/L (ref 39–117)
ALT SERPL W P-5'-P-CCNC: 18 U/L (ref 1–41)
ANION GAP SERPL CALCULATED.3IONS-SCNC: 9 MMOL/L (ref 5–15)
AST SERPL-CCNC: 18 U/L (ref 1–40)
BILIRUB SERPL-MCNC: 0.6 MG/DL (ref 0–1.2)
BUN SERPL-MCNC: 23 MG/DL (ref 6–20)
BUN/CREAT SERPL: 24 (ref 7–25)
CALCIUM SPEC-SCNC: 9.4 MG/DL (ref 8.6–10.5)
CHLORIDE SERPL-SCNC: 104 MMOL/L (ref 98–107)
CO2 SERPL-SCNC: 26 MMOL/L (ref 22–29)
CREAT SERPL-MCNC: 0.96 MG/DL (ref 0.76–1.27)
GFR SERPL CREATININE-BSD FRML MDRD: 80 ML/MIN/1.73
GLOBULIN UR ELPH-MCNC: 2.4 GM/DL
GLUCOSE SERPL-MCNC: 103 MG/DL (ref 65–99)
POTASSIUM SERPL-SCNC: 4.6 MMOL/L (ref 3.5–5.2)
PROT SERPL-MCNC: 7 G/DL (ref 6–8.5)
SODIUM SERPL-SCNC: 139 MMOL/L (ref 136–145)
T4 FREE SERPL-MCNC: 1.14 NG/DL (ref 0.93–1.7)
TSH SERPL DL<=0.05 MIU/L-ACNC: 4.67 UIU/ML (ref 0.27–4.2)

## 2022-01-14 PROCEDURE — 84439 ASSAY OF FREE THYROXINE: CPT | Performed by: INTERNAL MEDICINE

## 2022-01-14 PROCEDURE — 80053 COMPREHEN METABOLIC PANEL: CPT | Performed by: INTERNAL MEDICINE

## 2022-01-14 PROCEDURE — 36415 COLL VENOUS BLD VENIPUNCTURE: CPT | Performed by: INTERNAL MEDICINE

## 2022-01-14 PROCEDURE — 84443 ASSAY THYROID STIM HORMONE: CPT | Performed by: INTERNAL MEDICINE

## 2022-01-14 PROCEDURE — 99214 OFFICE O/P EST MOD 30 MIN: CPT | Performed by: INTERNAL MEDICINE

## 2022-01-14 RX ORDER — LEVOTHYROXINE SODIUM 0.07 MG/1
75 TABLET ORAL DAILY
Qty: 90 TABLET | Refills: 0 | Status: SHIPPED | OUTPATIENT
Start: 2022-01-14 | End: 2022-01-17 | Stop reason: DRUGHIGH

## 2022-01-14 NOTE — PROGRESS NOTES
"Chief Complaint  Follow-up (1 Month) and Hypertension    Subjective     {Problem List  Visit Diagnosis   Encounters  Notes  Medications  Labs  Result Review Imaging  Media :23}     Edgar Owens presents to [facility]  History of Present Illness    HTN-  States that he stopped the amlodipine because he had some swelling in right leg  His sister usually checks his bp  No chest pain  Breathing well    States that he has adjusted diet and he isn't drinking alcohol as much  Decreased breads and potates    Feels like thyroid is doing well  He is taking his meds currently    Objective   Vital Signs:   /78 (BP Location: Right arm, Patient Position: Sitting, Cuff Size: Adult)   Pulse 68   Temp 96.8 °F (36 °C)   Resp 18   Ht 162.6 cm (64\")   Wt 89.7 kg (197 lb 12.8 oz)   SpO2 98%   BMI 33.95 kg/m²     Physical Exam  Vitals reviewed.   Constitutional:       Appearance: Normal appearance. He is well-developed.   HENT:      Head: Normocephalic and atraumatic.      Right Ear: External ear normal.      Left Ear: External ear normal.   Eyes:      Conjunctiva/sclera: Conjunctivae normal.      Pupils: Pupils are equal, round, and reactive to light.   Cardiovascular:      Rate and Rhythm: Normal rate and regular rhythm.      Heart sounds: No murmur heard.  No friction rub. No gallop.    Pulmonary:      Effort: Pulmonary effort is normal.      Breath sounds: Normal breath sounds. No wheezing or rhonchi.   Skin:     General: Skin is warm and dry.   Neurological:      Mental Status: He is alert and oriented to person, place, and time.      Cranial Nerves: No cranial nerve deficit.   Psychiatric:         Mood and Affect: Affect normal.         Behavior: Behavior normal.         Thought Content: Thought content normal.        Result Review :     Common labs    Common Labsle 11/2/21 11/2/21 11/2/21 11/2/21    1253 1253 1253 1253   Glucose  92     BUN  16   "   Creatinine  1.00     eGFR Non African Am  77     Sodium  139     Potassium  4.6     Chloride  105     Calcium  9.8     Albumin  4.50     Total Bilirubin  0.6     Alkaline Phosphatase  44     AST (SGOT)  16     ALT (SGPT)  16     WBC 5.73      Hemoglobin 15.1      Hematocrit 43.4      Platelets 181      Total Cholesterol   190    Triglycerides   88    HDL Cholesterol   45    LDL Cholesterol    129 (A)    PSA    1.120   (A) Abnormal value               Procedures      Assessment and Plan    Diagnoses and all orders for this visit:    1. Primary hypertension (Primary)  Comments:  well controlled, cont current meds and diet  Orders:  -     Comprehensive Metabolic Panel  -     TSH  -     T4, Free    2. Hypothyroidism due to Hashimoto's thyroiditis  Comments:  will check labs, adjust meds as needed  Orders:  -     Comprehensive Metabolic Panel  -     TSH  -     T4, Free    3. Adrenal mass (HCC)  Comments:  will get him back in with Dr. Dewey for a check up, but he wants to wait a few months until COVID is better  Orders:  -     Ambulatory Referral to Urology    Other orders  -     levothyroxine (Synthroid) 75 MCG tablet; Take 1 tablet by mouth Daily.  Dispense: 90 tablet; Refill: 0              Follow Up   Return in about 4 months (around 5/14/2022).  Patient was given instructions and counseling regarding his condition or for health maintenance advice. Please see specific information pulled into the AVS if appropriate.

## 2022-01-17 ENCOUNTER — TELEPHONE (OUTPATIENT)
Dept: INTERNAL MEDICINE | Facility: CLINIC | Age: 57
End: 2022-01-17

## 2022-01-17 DIAGNOSIS — E06.3 HYPOTHYROIDISM DUE TO HASHIMOTO'S THYROIDITIS: Primary | ICD-10-CM

## 2022-01-17 DIAGNOSIS — E03.8 HYPOTHYROIDISM DUE TO HASHIMOTO'S THYROIDITIS: Primary | ICD-10-CM

## 2022-01-17 RX ORDER — LEVOTHYROXINE SODIUM 88 UG/1
88 TABLET ORAL DAILY
Qty: 30 TABLET | Refills: 1 | Status: SHIPPED | OUTPATIENT
Start: 2022-01-17 | End: 2022-03-18 | Stop reason: SDUPTHER

## 2022-01-17 NOTE — TELEPHONE ENCOUNTER
Red rule verified and correct.    Missed call regarding results.    Relayed message from Dr Jennifer Chairez.     Will send in 88 mcg per Dr Chairez's order.  Per Dr Chairez - I was supposed to send levothyroxine 88mcg daily; can you send for me please?      Requested Prescriptions     Signed Prescriptions Disp Refills   • levothyroxine (Synthroid) 88 MCG tablet 30 tablet 1     Sig: Take 1 tablet by mouth Daily.     Authorizing Provider: JENNIFER CHAIREZ     Ordering User: FIORELLA MASCORRO

## 2022-02-13 PROBLEM — E27.8 ADRENAL MASS: Status: ACTIVE | Noted: 2022-02-13

## 2022-02-13 NOTE — PROGRESS NOTES
Chief Complaint    Urologic complaint    Subjective          Edgar Owens presents to [facility]  History of Present Illness       56-year-old  male status post left adrenalectomy on 3/24/16.  Patient did have bilateral adrenal masses.      Patient's been doing well no pain.    Voiding without issue.    No GH.     1/22   0.9, 80    6/19 plasma metanephrines and low-dose dexamethasone test within normal limits    6/19 CT abdomen with/without - right adrenal mass 3 x 2.2 cm.  Previously 2.7 x 1.9.  Status post left adrenalectomy.  Tiny nonobstructing 2 mm calculus upper pole right kidney.    No cardiopulmonary history.  Patient has smoked about 1 pack per day for many years.  Patient does not use blood thinner.    Previous    12/16 CT abdomen/pelvis with and without - stable right adrenal adenoma.  2.11.3 cm.    3/16 left adrenalectomy  path -  showed adrenal cortical adenoma, size 5.0 x 4.5 x 3.3 cm    2/16  MRI with and without contrast - Bilateral adrenal masses, largest is 4.8 cm on the left.  On 1.7 right adrenal adenoma.  Findings are consistent with adenoma.    He did have a negative adrenal workup before surgery.    Has a Titanium plate in the head    PSA history    11/21  1.1       10/15  0.4         Past History:  Medical History: has a past medical history of Hypertension, Hypothyroidism, and Reflux esophagitis.   Surgical History: has a past surgical history that includes Adrenalectomy and Hemorrhoid surgery.   Family History: family history includes Colon cancer in his mother; Diabetes in his father and mother; Prostate cancer in his maternal uncle.   Social History: reports that he has been smoking cigarettes. He has never used smokeless tobacco. He reports current alcohol use. He reports that he does not use drugs.  Allergies: Patient has no known allergies.       Current Outpatient Medications:   •  ibuprofen (ADVIL,MOTRIN) 800 MG tablet, Take 1 tablet by mouth  Every 6 (Six) Hours As Needed for Mild Pain ., Disp: 90 tablet, Rfl: 0  •  levothyroxine (Synthroid) 88 MCG tablet, Take 1 tablet by mouth Daily., Disp: 30 tablet, Rfl: 1  •  telmisartan-hydrochlorothiazide (MICARDIS HCT) 80-12.5 MG per tablet, Take 1 tablet by mouth Daily., Disp: 90 tablet, Rfl: 1     Physical exam       Alert and orient x3  Well appearing, well developed, in no acute distress   Unlabored respirations  Nontender/nondistended      Grossly oriented to person, place and time, judgment is intact, normal mood and affect    Results for orders placed or performed in visit on 01/14/22   Comprehensive Metabolic Panel    Specimen: Arm, Right; Blood   Result Value Ref Range    Glucose 103 (H) 65 - 99 mg/dL    BUN 23 (H) 6 - 20 mg/dL    Creatinine 0.96 0.76 - 1.27 mg/dL    Sodium 139 136 - 145 mmol/L    Potassium 4.6 3.5 - 5.2 mmol/L    Chloride 104 98 - 107 mmol/L    CO2 26.0 22.0 - 29.0 mmol/L    Calcium 9.4 8.6 - 10.5 mg/dL    Total Protein 7.0 6.0 - 8.5 g/dL    Albumin 4.60 3.50 - 5.20 g/dL    ALT (SGPT) 18 1 - 41 U/L    AST (SGOT) 18 1 - 40 U/L    Alkaline Phosphatase 44 39 - 117 U/L    Total Bilirubin 0.6 0.0 - 1.2 mg/dL    eGFR Non African Amer 80 >60 mL/min/1.73    Globulin 2.4 gm/dL    A/G Ratio 1.9 g/dL    BUN/Creatinine Ratio 24.0 7.0 - 25.0    Anion Gap 9.0 5.0 - 15.0 mmol/L   TSH    Specimen: Arm, Right; Blood   Result Value Ref Range    TSH 4.670 (H) 0.270 - 4.200 uIU/mL   T4, Free    Specimen: Arm, Right; Blood   Result Value Ref Range    Free T4 1.14 0.93 - 1.70 ng/dL        Objective     Vital Signs:   There were no vitals taken for this visit.             Assessment and Plan    Diagnoses and all orders for this visit:    1. Adrenal mass (HCC) (Primary)        Patient has not had any imaging on his adrenal gland in the last couple years, we did discuss it like this is an adenoma which will never cause issues but it has been a few years after discussion of risk and benefits of go ahead and  follow him up for some abdominal imaging.    Follow-up in 6/22 with  adrenal mass protocol CT

## 2022-02-14 ENCOUNTER — OFFICE VISIT (OUTPATIENT)
Dept: UROLOGY | Facility: CLINIC | Age: 57
End: 2022-02-14

## 2022-02-14 VITALS — HEIGHT: 65 IN | WEIGHT: 199 LBS | RESPIRATION RATE: 16 BRPM | BODY MASS INDEX: 33.15 KG/M2

## 2022-02-14 DIAGNOSIS — E27.8 ADRENAL MASS: Primary | ICD-10-CM

## 2022-02-14 PROCEDURE — 99213 OFFICE O/P EST LOW 20 MIN: CPT | Performed by: UROLOGY

## 2022-03-18 ENCOUNTER — CLINICAL SUPPORT (OUTPATIENT)
Dept: INTERNAL MEDICINE | Facility: CLINIC | Age: 57
End: 2022-03-18

## 2022-03-18 DIAGNOSIS — E03.8 HYPOTHYROIDISM DUE TO HASHIMOTO'S THYROIDITIS: ICD-10-CM

## 2022-03-18 DIAGNOSIS — E06.3 HYPOTHYROIDISM DUE TO HASHIMOTO'S THYROIDITIS: ICD-10-CM

## 2022-03-18 LAB
T4 FREE SERPL-MCNC: 1.12 NG/DL (ref 0.93–1.7)
TSH SERPL DL<=0.05 MIU/L-ACNC: 3.02 UIU/ML (ref 0.27–4.2)

## 2022-03-18 PROCEDURE — 84439 ASSAY OF FREE THYROXINE: CPT | Performed by: INTERNAL MEDICINE

## 2022-03-18 PROCEDURE — 84443 ASSAY THYROID STIM HORMONE: CPT | Performed by: INTERNAL MEDICINE

## 2022-03-18 PROCEDURE — 36415 COLL VENOUS BLD VENIPUNCTURE: CPT | Performed by: INTERNAL MEDICINE

## 2022-03-18 RX ORDER — LEVOTHYROXINE SODIUM 88 UG/1
88 TABLET ORAL DAILY
Qty: 90 TABLET | Refills: 3 | Status: SHIPPED | OUTPATIENT
Start: 2022-03-18

## 2022-03-18 NOTE — TELEPHONE ENCOUNTER
Patient is needing a refill before he has to leave back out of town for work. Only has one pill left.

## 2022-04-04 ENCOUNTER — TELEPHONE (OUTPATIENT)
Dept: UROLOGY | Facility: CLINIC | Age: 57
End: 2022-04-04

## 2022-04-27 RX ORDER — TELMISARTAN AND HYDROCHLORTHIAZIDE 80; 12.5 MG/1; MG/1
1 TABLET ORAL DAILY
Qty: 90 TABLET | Refills: 0 | Status: SHIPPED | OUTPATIENT
Start: 2022-04-27 | End: 2022-08-01

## 2022-05-23 ENCOUNTER — HOSPITAL ENCOUNTER (OUTPATIENT)
Dept: CT IMAGING | Facility: HOSPITAL | Age: 57
Discharge: HOME | End: 2022-05-23
Admitting: UROLOGY

## 2022-05-23 DIAGNOSIS — E27.8 ADRENAL MASS: ICD-10-CM

## 2022-05-23 LAB
CREAT BLDA-MCNC: 1 MG/DL
EGFRCR SERPLBLD CKD-EPI 2021: 87.2 ML/MIN/1.73

## 2022-05-23 PROCEDURE — 74178 CT ABD&PLV WO CNTR FLWD CNTR: CPT

## 2022-05-23 PROCEDURE — 82565 ASSAY OF CREATININE: CPT

## 2022-05-23 PROCEDURE — 0 IOPAMIDOL PER 1 ML: Performed by: UROLOGY

## 2022-05-23 RX ADMIN — IOPAMIDOL 100 ML: 755 INJECTION, SOLUTION INTRAVENOUS at 09:12

## 2022-06-22 NOTE — PROGRESS NOTES
Chief Complaint    Urologic complaint    Subjective          Edgar Owens presents to [facility] UROLOGY  History of Present Illness       56-year-old  male       Right adrenal adenoma  status post left adrenalectomy on 3/24/16.  Patient did have bilateral adrenal masses.        5/23/22 CT abdomen with and without - slight interval increase in the right adrenal adenoma 3.5 cm.  2.9 in 2019    Patient did have a bad experience with the IV contrast.  Made him very very funny.  Not sure he would want another CT scan with contrast    Voiding ok.    No GH.     1/22   0.9, 80    6/19 plasma metanephrines and low-dose dexamethasone test within normal limits    6/19 CT abdomen with/without - right adrenal mass 3 x 2.2 cm.  Previously 2.7 x 1.9.  Status post left adrenalectomy.  Tiny nonobstructing 2 mm calculus upper pole right kidney.    No cardiopulmonary history.  Patient has smoked about 1 pack per day for many years.  Patient does not use blood thinner.    Previous    12/16 CT abdomen/pelvis with and without - stable right adrenal adenoma.  2.11.3 cm.    3/16 left adrenalectomy  path -  showed adrenal cortical adenoma, size 5.0 x 4.5 x 3.3 cm    2/16  MRI with and without contrast - Bilateral adrenal masses, largest is 4.8 cm on the left.  On 1.7 right adrenal adenoma.  Findings are consistent with adenoma.    He did have a negative adrenal workup before surgery.    Has a Titanium plate in the head    PSA history    11/21  1.1       10/15  0.4         Past History:  Medical History: has a past medical history of Hypertension, Hypothyroidism, and Reflux esophagitis.   Surgical History: has a past surgical history that includes Adrenalectomy and Hemorrhoid surgery.   Family History: family history includes Colon cancer in his mother; Diabetes in his father and mother; Prostate cancer in his maternal uncle.   Social History: reports that he has been smoking cigarettes. He has never used  smokeless tobacco. He reports current alcohol use. He reports that he does not use drugs.  Allergies: Patient has no known allergies.       Current Outpatient Medications:   •  ibuprofen (ADVIL,MOTRIN) 800 MG tablet, Take 1 tablet by mouth Every 6 (Six) Hours As Needed for Mild Pain ., Disp: 90 tablet, Rfl: 0  •  levothyroxine (Synthroid) 88 MCG tablet, Take 1 tablet by mouth Daily., Disp: 90 tablet, Rfl: 3  •  telmisartan-hydrochlorothiazide (MICARDIS HCT) 80-12.5 MG per tablet, Take 1 tablet by mouth Daily., Disp: 90 tablet, Rfl: 0     Physical exam       Alert and orient x3  Well appearing, well developed, in no acute distress   Unlabored respirations      Grossly oriented to person, place and time, judgment is intact, normal mood and affect    Results for orders placed or performed during the hospital encounter of 05/23/22   POC Creatinine    Specimen: Blood   Result Value Ref Range    Creatinine 1.00 mg/dL    eGFR 87.2 >60.0 mL/min/1.73        Objective     Vital Signs:   There were no vitals taken for this visit.             Assessment and Plan    Diagnoses and all orders for this visit:    1. Adrenal mass (HCC) (Primary)        Adrenal lesion has grown a few millimeters in the last few years.  We discussed this today.  We did discuss there is always a small risk of this being an adrenocortical carcinoma as it is growing.  We also discussed if we have to remove this adrenal he would be followed by endocrinology need to be on medication for the rest of his life because of this.  Patient is adamant he does not want to do this at this time which I think is reasonable    He understands the risk.    Patient also had a very hard time with a CT scan.  He is not interested in doing this anytime in the near future.    We discussed risk and benefits of how aggressively to follow this adrenal lesion.  Patient does not want to do any further work-up for a while.  After discussion we will get a CT abdomen with and without  contrast adrenal mass protocol in 3 years to see if adrenal lesion is staying stable or has grown more.  Patient voiced understanding

## 2022-06-24 ENCOUNTER — OFFICE VISIT (OUTPATIENT)
Dept: UROLOGY | Facility: CLINIC | Age: 57
End: 2022-06-24

## 2022-06-24 VITALS — BODY MASS INDEX: 32.62 KG/M2 | RESPIRATION RATE: 16 BRPM | WEIGHT: 195.8 LBS | HEIGHT: 65 IN

## 2022-06-24 DIAGNOSIS — E27.8 ADRENAL MASS: Primary | ICD-10-CM

## 2022-06-24 PROCEDURE — 99212 OFFICE O/P EST SF 10 MIN: CPT | Performed by: UROLOGY

## 2022-08-01 RX ORDER — TELMISARTAN AND HYDROCHLORTHIAZIDE 80; 12.5 MG/1; MG/1
TABLET ORAL
Qty: 90 TABLET | Refills: 0 | Status: SHIPPED | OUTPATIENT
Start: 2022-08-01 | End: 2022-11-15

## 2022-11-15 RX ORDER — TELMISARTAN AND HYDROCHLORTHIAZIDE 80; 12.5 MG/1; MG/1
TABLET ORAL
Qty: 90 TABLET | Refills: 1 | Status: SHIPPED | OUTPATIENT
Start: 2022-11-15

## 2023-04-14 DIAGNOSIS — E03.8 HYPOTHYROIDISM DUE TO HASHIMOTO'S THYROIDITIS: ICD-10-CM

## 2023-04-14 DIAGNOSIS — E06.3 HYPOTHYROIDISM DUE TO HASHIMOTO'S THYROIDITIS: ICD-10-CM

## 2023-04-17 RX ORDER — LEVOTHYROXINE SODIUM 88 UG/1
TABLET ORAL
Qty: 90 TABLET | Refills: 1 | Status: SHIPPED | OUTPATIENT
Start: 2023-04-17

## 2023-04-17 NOTE — TELEPHONE ENCOUNTER
I have refilled meds, but patient needs to schedule appointment with me in the next 3 months to get further refills.  Please call to let patient know this help get them scheduled.

## 2023-05-26 RX ORDER — TELMISARTAN AND HYDROCHLORTHIAZIDE 80; 12.5 MG/1; MG/1
TABLET ORAL
Qty: 90 TABLET | Refills: 1 | Status: SHIPPED | OUTPATIENT
Start: 2023-05-26

## 2023-10-26 DIAGNOSIS — E06.3 HYPOTHYROIDISM DUE TO HASHIMOTO'S THYROIDITIS: ICD-10-CM

## 2023-10-26 DIAGNOSIS — E03.8 HYPOTHYROIDISM DUE TO HASHIMOTO'S THYROIDITIS: ICD-10-CM

## 2023-10-27 RX ORDER — LEVOTHYROXINE SODIUM 88 UG/1
TABLET ORAL
Qty: 90 TABLET | Refills: 1 | Status: SHIPPED | OUTPATIENT
Start: 2023-10-27

## 2023-10-27 NOTE — TELEPHONE ENCOUNTER
Called pt, he states he only has 3 pills left. Prescription request fails protocols. Please advise

## 2023-10-27 NOTE — TELEPHONE ENCOUNTER
Left Voicemail for patient to return call to schedule a follow up srini.   Griseofulvin Counseling:  I discussed with the patient the risks of griseofulvin including but not limited to photosensitivity, cytopenia, liver damage, nausea/vomiting and severe allergy.  The patient understands that this medication is best absorbed when taken with a fatty meal (e.g., ice cream or french fries).

## 2023-11-27 RX ORDER — TELMISARTAN AND HYDROCHLORTHIAZIDE 80; 12.5 MG/1; MG/1
1 TABLET ORAL DAILY
Qty: 90 TABLET | Refills: 1 | Status: SHIPPED | OUTPATIENT
Start: 2023-11-27

## 2023-12-26 RX ORDER — TELMISARTAN AND HYDROCHLORTHIAZIDE 80; 12.5 MG/1; MG/1
1 TABLET ORAL DAILY
Qty: 90 TABLET | Refills: 1 | Status: SHIPPED | OUTPATIENT
Start: 2023-12-26

## 2024-01-31 ENCOUNTER — OFFICE VISIT (OUTPATIENT)
Dept: INTERNAL MEDICINE | Facility: CLINIC | Age: 59
End: 2024-01-31
Payer: COMMERCIAL

## 2024-01-31 VITALS
HEART RATE: 73 BPM | HEIGHT: 64 IN | OXYGEN SATURATION: 97 % | WEIGHT: 195.2 LBS | BODY MASS INDEX: 33.32 KG/M2 | SYSTOLIC BLOOD PRESSURE: 142 MMHG | DIASTOLIC BLOOD PRESSURE: 78 MMHG | TEMPERATURE: 98.1 F

## 2024-01-31 DIAGNOSIS — Z00.00 ANNUAL PHYSICAL EXAM: Primary | ICD-10-CM

## 2024-01-31 DIAGNOSIS — Z12.5 SCREENING PSA (PROSTATE SPECIFIC ANTIGEN): ICD-10-CM

## 2024-01-31 DIAGNOSIS — M70.22 OLECRANON BURSITIS OF LEFT ELBOW: ICD-10-CM

## 2024-01-31 DIAGNOSIS — E03.9 HYPOTHYROIDISM, UNSPECIFIED TYPE: ICD-10-CM

## 2024-01-31 DIAGNOSIS — I10 PRIMARY HYPERTENSION: ICD-10-CM

## 2024-01-31 LAB
ALBUMIN SERPL-MCNC: 4.6 G/DL (ref 3.5–5.2)
ALBUMIN/GLOB SERPL: 1.6 G/DL
ALP SERPL-CCNC: 54 U/L (ref 39–117)
ALT SERPL W P-5'-P-CCNC: 14 U/L (ref 1–41)
ANION GAP SERPL CALCULATED.3IONS-SCNC: 11.8 MMOL/L (ref 5–15)
AST SERPL-CCNC: 18 U/L (ref 1–40)
BASOPHILS # BLD AUTO: 0.06 10*3/MM3 (ref 0–0.2)
BASOPHILS NFR BLD AUTO: 0.6 % (ref 0–1.5)
BILIRUB SERPL-MCNC: 0.7 MG/DL (ref 0–1.2)
BUN SERPL-MCNC: 18 MG/DL (ref 8–23)
BUN/CREAT SERPL: 17.5 (ref 7–25)
CALCIUM SPEC-SCNC: 9.8 MG/DL (ref 8.6–10.5)
CHLORIDE SERPL-SCNC: 100 MMOL/L (ref 98–107)
CHOLEST SERPL-MCNC: 193 MG/DL (ref 0–200)
CO2 SERPL-SCNC: 26.2 MMOL/L (ref 22–29)
CREAT SERPL-MCNC: 1.03 MG/DL (ref 0.76–1.27)
DEPRECATED RDW RBC AUTO: 41.2 FL (ref 37–54)
EGFRCR SERPLBLD CKD-EPI 2021: 83.2 ML/MIN/1.73
EOSINOPHIL # BLD AUTO: 0.2 10*3/MM3 (ref 0–0.4)
EOSINOPHIL NFR BLD AUTO: 2.2 % (ref 0.3–6.2)
ERYTHROCYTE [DISTWIDTH] IN BLOOD BY AUTOMATED COUNT: 12.1 % (ref 12.3–15.4)
GLOBULIN UR ELPH-MCNC: 2.8 GM/DL
GLUCOSE SERPL-MCNC: 82 MG/DL (ref 65–99)
HCT VFR BLD AUTO: 41.9 % (ref 37.5–51)
HDLC SERPL-MCNC: 44 MG/DL (ref 40–60)
HGB BLD-MCNC: 14.6 G/DL (ref 13–17.7)
IMM GRANULOCYTES # BLD AUTO: 0.03 10*3/MM3 (ref 0–0.05)
IMM GRANULOCYTES NFR BLD AUTO: 0.3 % (ref 0–0.5)
LDLC SERPL CALC-MCNC: 134 MG/DL (ref 0–100)
LDLC/HDLC SERPL: 3.01 {RATIO}
LYMPHOCYTES # BLD AUTO: 1.62 10*3/MM3 (ref 0.7–3.1)
LYMPHOCYTES NFR BLD AUTO: 17.5 % (ref 19.6–45.3)
MCH RBC QN AUTO: 32.6 PG (ref 26.6–33)
MCHC RBC AUTO-ENTMCNC: 34.8 G/DL (ref 31.5–35.7)
MCV RBC AUTO: 93.5 FL (ref 79–97)
MONOCYTES # BLD AUTO: 0.66 10*3/MM3 (ref 0.1–0.9)
MONOCYTES NFR BLD AUTO: 7.1 % (ref 5–12)
NEUTROPHILS NFR BLD AUTO: 6.7 10*3/MM3 (ref 1.7–7)
NEUTROPHILS NFR BLD AUTO: 72.3 % (ref 42.7–76)
NRBC BLD AUTO-RTO: 0 /100 WBC (ref 0–0.2)
PLATELET # BLD AUTO: 207 10*3/MM3 (ref 140–450)
PMV BLD AUTO: 11.2 FL (ref 6–12)
POTASSIUM SERPL-SCNC: 4.7 MMOL/L (ref 3.5–5.2)
PROT SERPL-MCNC: 7.4 G/DL (ref 6–8.5)
PSA SERPL-MCNC: 1.13 NG/ML (ref 0–4)
RBC # BLD AUTO: 4.48 10*6/MM3 (ref 4.14–5.8)
SODIUM SERPL-SCNC: 138 MMOL/L (ref 136–145)
T4 FREE SERPL-MCNC: 1.12 NG/DL (ref 0.93–1.7)
TRIGL SERPL-MCNC: 83 MG/DL (ref 0–150)
TSH SERPL DL<=0.05 MIU/L-ACNC: 2.12 UIU/ML (ref 0.27–4.2)
VLDLC SERPL-MCNC: 15 MG/DL (ref 5–40)
WBC NRBC COR # BLD AUTO: 9.27 10*3/MM3 (ref 3.4–10.8)

## 2024-01-31 PROCEDURE — 99396 PREV VISIT EST AGE 40-64: CPT | Performed by: INTERNAL MEDICINE

## 2024-01-31 PROCEDURE — 84439 ASSAY OF FREE THYROXINE: CPT | Performed by: INTERNAL MEDICINE

## 2024-01-31 PROCEDURE — 85025 COMPLETE CBC W/AUTO DIFF WBC: CPT | Performed by: INTERNAL MEDICINE

## 2024-01-31 PROCEDURE — 36415 COLL VENOUS BLD VENIPUNCTURE: CPT | Performed by: INTERNAL MEDICINE

## 2024-01-31 PROCEDURE — 84443 ASSAY THYROID STIM HORMONE: CPT | Performed by: INTERNAL MEDICINE

## 2024-01-31 PROCEDURE — 80061 LIPID PANEL: CPT | Performed by: INTERNAL MEDICINE

## 2024-01-31 PROCEDURE — G0103 PSA SCREENING: HCPCS | Performed by: INTERNAL MEDICINE

## 2024-01-31 PROCEDURE — 80053 COMPREHEN METABOLIC PANEL: CPT | Performed by: INTERNAL MEDICINE

## 2024-01-31 RX ORDER — IBUPROFEN 800 MG/1
800 TABLET ORAL EVERY 6 HOURS PRN
Qty: 90 TABLET | Refills: 0 | Status: SHIPPED | OUTPATIENT
Start: 2024-01-31
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
## 2024-01-31 NOTE — PROGRESS NOTES
Venipuncture Blood Specimen Collection  Venipuncture performed in [facility] by Nidia Gonzalez RN with good hemostasis. Patient tolerated the procedure well without complications.   01/31/24   Nidia Gonzalez RN

## 2024-04-25 DIAGNOSIS — E03.8 HYPOTHYROIDISM DUE TO HASHIMOTO'S THYROIDITIS: ICD-10-CM

## 2024-04-25 DIAGNOSIS — E06.3 HYPOTHYROIDISM DUE TO HASHIMOTO'S THYROIDITIS: ICD-10-CM

## 2024-04-25 RX ORDER — LEVOTHYROXINE SODIUM 88 UG/1
88 TABLET ORAL DAILY
Qty: 90 TABLET | Refills: 1 | Status: SHIPPED | OUTPATIENT
Start: 2024-04-25

## 2024-04-30 DIAGNOSIS — E03.8 HYPOTHYROIDISM DUE TO HASHIMOTO'S THYROIDITIS: ICD-10-CM

## 2024-04-30 DIAGNOSIS — E06.3 HYPOTHYROIDISM DUE TO HASHIMOTO'S THYROIDITIS: ICD-10-CM

## 2024-05-01 RX ORDER — LEVOTHYROXINE SODIUM 88 UG/1
88 TABLET ORAL DAILY
Qty: 30 TABLET | Refills: 0 | Status: SHIPPED | OUTPATIENT
Start: 2024-05-01

## 2024-08-02 ENCOUNTER — OFFICE VISIT (OUTPATIENT)
Dept: INTERNAL MEDICINE | Facility: CLINIC | Age: 59
End: 2024-08-02
Payer: COMMERCIAL

## 2024-08-02 VITALS
BODY MASS INDEX: 31 KG/M2 | TEMPERATURE: 98 F | OXYGEN SATURATION: 97 % | SYSTOLIC BLOOD PRESSURE: 132 MMHG | WEIGHT: 181.6 LBS | DIASTOLIC BLOOD PRESSURE: 74 MMHG | RESPIRATION RATE: 20 BRPM | HEIGHT: 64 IN | HEART RATE: 83 BPM

## 2024-08-02 DIAGNOSIS — Z00.00 ANNUAL PHYSICAL EXAM: Primary | ICD-10-CM

## 2024-08-02 DIAGNOSIS — E06.3 HYPOTHYROIDISM DUE TO HASHIMOTO'S THYROIDITIS: ICD-10-CM

## 2024-08-02 DIAGNOSIS — E03.8 HYPOTHYROIDISM DUE TO HASHIMOTO'S THYROIDITIS: ICD-10-CM

## 2024-08-02 DIAGNOSIS — R25.2 LEG CRAMP: ICD-10-CM

## 2024-08-02 DIAGNOSIS — I10 PRIMARY HYPERTENSION: ICD-10-CM

## 2024-08-02 LAB
ALBUMIN SERPL-MCNC: 4.5 G/DL (ref 3.5–5.2)
ALBUMIN/GLOB SERPL: 1.5 G/DL
ALP SERPL-CCNC: 53 U/L (ref 39–117)
ALT SERPL W P-5'-P-CCNC: 13 U/L (ref 1–41)
ANION GAP SERPL CALCULATED.3IONS-SCNC: 9.7 MMOL/L (ref 5–15)
AST SERPL-CCNC: 20 U/L (ref 1–40)
BASOPHILS # BLD AUTO: 0.06 10*3/MM3 (ref 0–0.2)
BASOPHILS NFR BLD AUTO: 1.1 % (ref 0–1.5)
BILIRUB SERPL-MCNC: 0.7 MG/DL (ref 0–1.2)
BUN SERPL-MCNC: 28 MG/DL (ref 8–23)
BUN/CREAT SERPL: 23.7 (ref 7–25)
CALCIUM SPEC-SCNC: 9.8 MG/DL (ref 8.6–10.5)
CHLORIDE SERPL-SCNC: 101 MMOL/L (ref 98–107)
CHOLEST SERPL-MCNC: 195 MG/DL (ref 0–200)
CO2 SERPL-SCNC: 24.3 MMOL/L (ref 22–29)
CREAT SERPL-MCNC: 1.18 MG/DL (ref 0.76–1.27)
DEPRECATED RDW RBC AUTO: 43.2 FL (ref 37–54)
EGFRCR SERPLBLD CKD-EPI 2021: 70.6 ML/MIN/1.73
EOSINOPHIL # BLD AUTO: 0.22 10*3/MM3 (ref 0–0.4)
EOSINOPHIL NFR BLD AUTO: 4 % (ref 0.3–6.2)
ERYTHROCYTE [DISTWIDTH] IN BLOOD BY AUTOMATED COUNT: 12.4 % (ref 12.3–15.4)
GLOBULIN UR ELPH-MCNC: 3 GM/DL
GLUCOSE SERPL-MCNC: 126 MG/DL (ref 65–99)
HCT VFR BLD AUTO: 40.7 % (ref 37.5–51)
HDLC SERPL-MCNC: 42 MG/DL (ref 40–60)
HGB BLD-MCNC: 14.3 G/DL (ref 13–17.7)
IMM GRANULOCYTES # BLD AUTO: 0.02 10*3/MM3 (ref 0–0.05)
IMM GRANULOCYTES NFR BLD AUTO: 0.4 % (ref 0–0.5)
LDLC SERPL CALC-MCNC: 131 MG/DL (ref 0–100)
LDLC/HDLC SERPL: 3.08 {RATIO}
LYMPHOCYTES # BLD AUTO: 1.13 10*3/MM3 (ref 0.7–3.1)
LYMPHOCYTES NFR BLD AUTO: 20.5 % (ref 19.6–45.3)
MCH RBC QN AUTO: 33.6 PG (ref 26.6–33)
MCHC RBC AUTO-ENTMCNC: 35.1 G/DL (ref 31.5–35.7)
MCV RBC AUTO: 95.5 FL (ref 79–97)
MONOCYTES # BLD AUTO: 0.65 10*3/MM3 (ref 0.1–0.9)
MONOCYTES NFR BLD AUTO: 11.8 % (ref 5–12)
NEUTROPHILS NFR BLD AUTO: 3.44 10*3/MM3 (ref 1.7–7)
NEUTROPHILS NFR BLD AUTO: 62.2 % (ref 42.7–76)
NRBC BLD AUTO-RTO: 0 /100 WBC (ref 0–0.2)
PLATELET # BLD AUTO: 250 10*3/MM3 (ref 140–450)
PMV BLD AUTO: 11.2 FL (ref 6–12)
POTASSIUM SERPL-SCNC: 5.1 MMOL/L (ref 3.5–5.2)
PROT SERPL-MCNC: 7.5 G/DL (ref 6–8.5)
RBC # BLD AUTO: 4.26 10*6/MM3 (ref 4.14–5.8)
SODIUM SERPL-SCNC: 135 MMOL/L (ref 136–145)
T4 FREE SERPL-MCNC: 1.36 NG/DL (ref 0.92–1.68)
TRIGL SERPL-MCNC: 119 MG/DL (ref 0–150)
TSH SERPL DL<=0.05 MIU/L-ACNC: 2.57 UIU/ML (ref 0.27–4.2)
VLDLC SERPL-MCNC: 22 MG/DL (ref 5–40)
WBC NRBC COR # BLD AUTO: 5.52 10*3/MM3 (ref 3.4–10.8)

## 2024-08-02 PROCEDURE — 85025 COMPLETE CBC W/AUTO DIFF WBC: CPT | Performed by: INTERNAL MEDICINE

## 2024-08-02 PROCEDURE — 80053 COMPREHEN METABOLIC PANEL: CPT | Performed by: INTERNAL MEDICINE

## 2024-08-02 PROCEDURE — 84443 ASSAY THYROID STIM HORMONE: CPT | Performed by: INTERNAL MEDICINE

## 2024-08-02 PROCEDURE — 80061 LIPID PANEL: CPT | Performed by: INTERNAL MEDICINE

## 2024-08-02 PROCEDURE — 84439 ASSAY OF FREE THYROXINE: CPT | Performed by: INTERNAL MEDICINE

## 2024-08-02 NOTE — PROGRESS NOTES
"Chief Complaint  Hypertension (6 mo fu), Hypothyroidism, and Leg Pain (Both legs )      Subjective      History of Present Illness  The patient is a 59-year-old male who presents for evaluation of multiple medical concerns.    He began a job that requires him to walk 7 to 8 miles daily. His chiropractor suggested leg inflammation due to overuse and advised him to seek medical attention. He experienced leg cramping last night. The discomfort does not bother him while sitting, but walking exacerbates it. He prefers not to take excessive ibuprofen. He does not take any multivitamins or electrolytes. The leg pain began in March 2023.    He has lost 14 pounds, which he attributes to a healthy diet. He denies experiencing chest pain or breathing difficulties. He believes his thyroid medication is effective. Overall, he feels well. He consumes V8 juice at dinner when his blood pressure becomes high and has reduced his salt intake.      He still smokes every now and then.         Objective   Vital Signs:   Vitals:    08/02/24 0801   BP: 132/74   BP Location: Left arm   Patient Position: Sitting   Cuff Size: Adult   Pulse: 83   Resp: 20   Temp: 98 °F (36.7 °C)   TempSrc: Temporal   SpO2: 97%   Weight: 82.4 kg (181 lb 9.6 oz)   Height: 162.6 cm (64.02\")     Body mass index is 31.16 kg/m².    Wt Readings from Last 3 Encounters:   08/02/24 82.4 kg (181 lb 9.6 oz)   01/31/24 88.5 kg (195 lb 3.2 oz)   06/24/22 88.8 kg (195 lb 12.8 oz)     BP Readings from Last 3 Encounters:   08/02/24 132/74   01/31/24 142/78   01/14/22 130/78       Health Maintenance   Topic Date Due    INFLUENZA VACCINE  08/03/2024 (Originally 8/1/2024)    COVID-19 Vaccine (1 - 2023-24 season) 01/31/2025 (Originally 9/1/2023)    Pneumococcal Vaccine 0-64 (1 of 2 - PCV) 01/31/2025 (Originally 12/17/1971)    TDAP/TD VACCINES (2 - Tdap) 01/31/2025 (Originally 7/27/2014)    ZOSTER VACCINE (1 of 2) 01/31/2025 (Originally 12/17/2013)    ANNUAL PHYSICAL  01/31/2025    " BMI FOLLOWUP  01/31/2025    COLORECTAL CANCER SCREENING  10/23/2025    HEPATITIS C SCREENING  Completed       Physical Exam  Vitals reviewed.   Constitutional:       Appearance: Normal appearance. He is well-developed.   HENT:      Head: Normocephalic and atraumatic.      Right Ear: External ear normal.      Left Ear: External ear normal.   Eyes:      Conjunctiva/sclera: Conjunctivae normal.      Pupils: Pupils are equal, round, and reactive to light.   Cardiovascular:      Rate and Rhythm: Normal rate and regular rhythm.      Heart sounds: No murmur heard.     No friction rub. No gallop.   Pulmonary:      Effort: Pulmonary effort is normal.      Breath sounds: Normal breath sounds. No wheezing or rhonchi.   Skin:     General: Skin is warm and dry.   Neurological:      Mental Status: He is alert and oriented to person, place, and time.   Psychiatric:         Mood and Affect: Affect normal.         Behavior: Behavior normal.         Thought Content: Thought content normal.        Physical Exam        Result Review :  The following data was reviewed by: Jennifer Lyon MD on 08/02/2024:         Results              Procedures            Assessment & Plan  Hypothyroidism due to Hashimoto's thyroiditis  Well controlled, cont current meds  Primary hypertension  His blood pressure is slightly elevated, but not to a concerning degree. He will continue his current medication regimen. A low sodium V8 supplement was suggested due to its high salt content, which could elevate his blood pressure.  Annual physical exam  Counseled on diet and exercise; UTD on screening  Leg cramp  The leg pain could be due to a combination of overuse or blood pressure medication, which can cause electrolyte imbalances, or muscle cramps. He was advised to take Tylenol in conjunction with ibuprofen. Blood work was ordered to assess his kidney function. He was advised to rest as much as possible. A multivitamin and electrolytes were recommended,  along with adequate hydration. Should the leg pain persist with movement, further investigation for potential leg blockages will be considered.    Orders Placed This Encounter   Procedures    Comprehensive metabolic panel    Lipid panel    TSH    T4, free    CBC Auto Differential    CBC w AUTO Differential             Assessment & Plan              Patient or patient representative verbalized consent for the use of Ambient Listening during the visit with  Jennifer Lyon MD for chart documentation. 8/2/2024  08:57 EDT      FOLLOW UP  Return in about 6 months (around 2/2/2025).  Patient was given instructions and counseling regarding his condition or for health maintenance advice. Please see specific information pulled into the AVS if appropriate.     Jennifer Lyon MD  08/02/24  09:19 EDT    CURRENT & DISCONTINUED MEDICATIONS  Current Outpatient Medications   Medication Instructions    ibuprofen (ADVIL,MOTRIN) 800 mg, Oral, Every 6 Hours PRN    levothyroxine (SYNTHROID, LEVOTHROID) 88 mcg, Oral, Daily    telmisartan-hydrochlorothiazide (MICARDIS HCT) 80-12.5 MG per tablet 1 tablet, Oral, Daily       There are no discontinued medications.

## 2024-08-02 NOTE — ASSESSMENT & PLAN NOTE
His blood pressure is slightly elevated, but not to a concerning degree. He will continue his current medication regimen. A low sodium V8 supplement was suggested due to its high salt content, which could elevate his blood pressure.

## 2024-11-30 DIAGNOSIS — E06.3 HYPOTHYROIDISM DUE TO HASHIMOTO'S THYROIDITIS: ICD-10-CM

## 2024-12-02 RX ORDER — TELMISARTAN AND HYDROCHLORTHIAZIDE 80; 12.5 MG/1; MG/1
1 TABLET ORAL DAILY
Qty: 30 TABLET | Refills: 1 | Status: SHIPPED | OUTPATIENT
Start: 2024-12-02

## 2024-12-02 RX ORDER — LEVOTHYROXINE SODIUM 88 UG/1
88 TABLET ORAL DAILY
Qty: 30 TABLET | Refills: 0 | Status: SHIPPED | OUTPATIENT
Start: 2024-12-02

## 2025-01-02 DIAGNOSIS — E06.3 HYPOTHYROIDISM DUE TO HASHIMOTO'S THYROIDITIS: ICD-10-CM

## 2025-01-02 RX ORDER — LEVOTHYROXINE SODIUM 88 UG/1
88 TABLET ORAL DAILY
Qty: 30 TABLET | Refills: 0 | Status: SHIPPED | OUTPATIENT
Start: 2025-01-02

## 2025-01-30 DIAGNOSIS — E06.3 HYPOTHYROIDISM DUE TO HASHIMOTO'S THYROIDITIS: ICD-10-CM

## 2025-01-30 RX ORDER — LEVOTHYROXINE SODIUM 88 UG/1
88 TABLET ORAL DAILY
Qty: 30 TABLET | Refills: 0 | Status: SHIPPED | OUTPATIENT
Start: 2025-01-30

## 2025-01-30 RX ORDER — TELMISARTAN AND HYDROCHLORTHIAZIDE 80; 12.5 MG/1; MG/1
1 TABLET ORAL DAILY
Qty: 30 TABLET | Refills: 1 | Status: SHIPPED | OUTPATIENT
Start: 2025-01-30 | End: 2025-02-03

## 2025-02-03 ENCOUNTER — OFFICE VISIT (OUTPATIENT)
Dept: INTERNAL MEDICINE | Facility: CLINIC | Age: 60
End: 2025-02-03
Payer: COMMERCIAL

## 2025-02-03 VITALS
HEIGHT: 64 IN | DIASTOLIC BLOOD PRESSURE: 82 MMHG | BODY MASS INDEX: 31.48 KG/M2 | TEMPERATURE: 97.7 F | HEART RATE: 90 BPM | WEIGHT: 184.4 LBS | SYSTOLIC BLOOD PRESSURE: 154 MMHG | RESPIRATION RATE: 24 BRPM | OXYGEN SATURATION: 97 %

## 2025-02-03 DIAGNOSIS — E06.3 HYPOTHYROIDISM DUE TO HASHIMOTO THYROIDITIS: ICD-10-CM

## 2025-02-03 DIAGNOSIS — Z12.11 ENCOUNTER FOR SCREENING COLONOSCOPY: ICD-10-CM

## 2025-02-03 DIAGNOSIS — E27.8 ADRENAL MASS: ICD-10-CM

## 2025-02-03 DIAGNOSIS — Z00.00 ANNUAL PHYSICAL EXAM: Primary | ICD-10-CM

## 2025-02-03 DIAGNOSIS — I10 PRIMARY HYPERTENSION: ICD-10-CM

## 2025-02-03 DIAGNOSIS — Z12.5 SCREENING PSA (PROSTATE SPECIFIC ANTIGEN): ICD-10-CM

## 2025-02-03 DIAGNOSIS — Z13.220 SCREENING, LIPID: ICD-10-CM

## 2025-02-03 LAB
ALBUMIN SERPL-MCNC: 4.2 G/DL (ref 3.5–5.2)
ALBUMIN/GLOB SERPL: 1.5 G/DL
ALP SERPL-CCNC: 46 U/L (ref 39–117)
ALT SERPL W P-5'-P-CCNC: 15 U/L (ref 1–41)
ANION GAP SERPL CALCULATED.3IONS-SCNC: 9 MMOL/L (ref 5–15)
AST SERPL-CCNC: 17 U/L (ref 1–40)
BASOPHILS # BLD AUTO: 0.04 10*3/MM3 (ref 0–0.2)
BASOPHILS NFR BLD AUTO: 0.7 % (ref 0–1.5)
BILIRUB SERPL-MCNC: 0.5 MG/DL (ref 0–1.2)
BUN SERPL-MCNC: 19 MG/DL (ref 8–23)
BUN/CREAT SERPL: 20.2 (ref 7–25)
CALCIUM SPEC-SCNC: 9.4 MG/DL (ref 8.6–10.5)
CHLORIDE SERPL-SCNC: 106 MMOL/L (ref 98–107)
CHOLEST SERPL-MCNC: 183 MG/DL (ref 0–200)
CO2 SERPL-SCNC: 25 MMOL/L (ref 22–29)
CREAT SERPL-MCNC: 0.94 MG/DL (ref 0.76–1.27)
DEPRECATED RDW RBC AUTO: 42.7 FL (ref 37–54)
EGFRCR SERPLBLD CKD-EPI 2021: 92.2 ML/MIN/1.73
EOSINOPHIL # BLD AUTO: 0.24 10*3/MM3 (ref 0–0.4)
EOSINOPHIL NFR BLD AUTO: 4.2 % (ref 0.3–6.2)
ERYTHROCYTE [DISTWIDTH] IN BLOOD BY AUTOMATED COUNT: 12 % (ref 12.3–15.4)
GLOBULIN UR ELPH-MCNC: 2.8 GM/DL
GLUCOSE SERPL-MCNC: 107 MG/DL (ref 65–99)
HCT VFR BLD AUTO: 42.1 % (ref 37.5–51)
HDLC SERPL-MCNC: 46 MG/DL (ref 40–60)
HGB BLD-MCNC: 14 G/DL (ref 13–17.7)
IMM GRANULOCYTES # BLD AUTO: 0.04 10*3/MM3 (ref 0–0.05)
IMM GRANULOCYTES NFR BLD AUTO: 0.7 % (ref 0–0.5)
LDLC SERPL CALC-MCNC: 123 MG/DL (ref 0–100)
LDLC/HDLC SERPL: 2.65 {RATIO}
LYMPHOCYTES # BLD AUTO: 0.88 10*3/MM3 (ref 0.7–3.1)
LYMPHOCYTES NFR BLD AUTO: 15.4 % (ref 19.6–45.3)
MCH RBC QN AUTO: 32.2 PG (ref 26.6–33)
MCHC RBC AUTO-ENTMCNC: 33.3 G/DL (ref 31.5–35.7)
MCV RBC AUTO: 96.8 FL (ref 79–97)
MONOCYTES # BLD AUTO: 0.57 10*3/MM3 (ref 0.1–0.9)
MONOCYTES NFR BLD AUTO: 10 % (ref 5–12)
NEUTROPHILS NFR BLD AUTO: 3.93 10*3/MM3 (ref 1.7–7)
NEUTROPHILS NFR BLD AUTO: 69 % (ref 42.7–76)
NRBC BLD AUTO-RTO: 0 /100 WBC (ref 0–0.2)
PLATELET # BLD AUTO: 228 10*3/MM3 (ref 140–450)
PMV BLD AUTO: 11.2 FL (ref 6–12)
POTASSIUM SERPL-SCNC: 4.5 MMOL/L (ref 3.5–5.2)
PROT SERPL-MCNC: 7 G/DL (ref 6–8.5)
PSA SERPL-MCNC: 1.29 NG/ML (ref 0–4)
RBC # BLD AUTO: 4.35 10*6/MM3 (ref 4.14–5.8)
SODIUM SERPL-SCNC: 140 MMOL/L (ref 136–145)
T4 FREE SERPL-MCNC: 1.25 NG/DL (ref 0.92–1.68)
TRIGL SERPL-MCNC: 75 MG/DL (ref 0–150)
TSH SERPL DL<=0.05 MIU/L-ACNC: 3.62 UIU/ML (ref 0.27–4.2)
VLDLC SERPL-MCNC: 14 MG/DL (ref 5–40)
WBC NRBC COR # BLD AUTO: 5.7 10*3/MM3 (ref 3.4–10.8)

## 2025-02-03 PROCEDURE — 99396 PREV VISIT EST AGE 40-64: CPT | Performed by: INTERNAL MEDICINE

## 2025-02-03 PROCEDURE — 84439 ASSAY OF FREE THYROXINE: CPT | Performed by: INTERNAL MEDICINE

## 2025-02-03 PROCEDURE — 80061 LIPID PANEL: CPT | Performed by: INTERNAL MEDICINE

## 2025-02-03 PROCEDURE — 85025 COMPLETE CBC W/AUTO DIFF WBC: CPT | Performed by: INTERNAL MEDICINE

## 2025-02-03 PROCEDURE — 80053 COMPREHEN METABOLIC PANEL: CPT | Performed by: INTERNAL MEDICINE

## 2025-02-03 PROCEDURE — 84443 ASSAY THYROID STIM HORMONE: CPT | Performed by: INTERNAL MEDICINE

## 2025-02-03 PROCEDURE — G0103 PSA SCREENING: HCPCS | Performed by: INTERNAL MEDICINE

## 2025-02-03 RX ORDER — TELMISARTAN AND AMLODIPINE 5; 80 MG/1; MG/1
1 TABLET ORAL DAILY
Qty: 90 TABLET | Refills: 1 | Status: SHIPPED | OUTPATIENT
Start: 2025-02-03

## 2025-02-03 RX ORDER — IBUPROFEN 800 MG/1
800 TABLET, FILM COATED ORAL EVERY 6 HOURS PRN
Qty: 90 TABLET | Refills: 0 | Status: SHIPPED | OUTPATIENT
Start: 2025-02-03

## 2025-02-03 RX ORDER — TELMISARTAN AND HYDROCHLORTHIAZIDE 80; 12.5 MG/1; MG/1
1 TABLET ORAL DAILY
Qty: 30 TABLET | Refills: 1 | Status: SHIPPED | OUTPATIENT
Start: 2025-02-03 | End: 2025-02-03

## 2025-02-03 NOTE — PROGRESS NOTES
"Chief Complaint  Hypothyroidism and Hypertension (6 mo f/u/)      Subjective      History of Present Illness  The patient presents for evaluation of hypertension, stress, pinched nerve, and health maintenance.    He reports a recent dental visit with normal blood pressure. He experiences anxiety related to upcoming blood work. Home blood pressure readings, monitored by his sister, have been consistently normal, typically in the 130s systolic range. He recalls a reading of 127/76 at his dentist's office. He adheres to his antihypertensive medication regimen, taking his dose at 5:30 AM daily. He is reluctant to add another medication. He reports no respiratory distress or chest pain. He acknowledges recent weight gain and has been advised to increase physical activity. He also reports frequent urination.    He has been experiencing significant stress following the death of his mother in December 2023 and ongoing family issues with his brother. He is considering senior care either this year or the next.    He reports occasional leg discomfort, attributed to overexertion. Diagnosed with a pinched nerve, he seeks a prescription for ibuprofen, preferring it over OTC medications.    He does not think he has a family history of colon cancer. He has had a colonoscopy in the past and plans to schedule another one soon. He is due for a tetanus vaccine but declines it. He admits to smoking, but not much.    SOCIAL HISTORY  - Admits to smoking, but not much    IMMUNIZATIONS  - Due for a tetanus vaccine but declines it  Prefers not to take any vaccinations         Objective   Vital Signs:   Vitals:    02/03/25 0755   BP: 154/82   BP Location: Right arm   Patient Position: Sitting   Cuff Size: Adult   Pulse: 90   Resp: 24   Temp: 97.7 °F (36.5 °C)   TempSrc: Temporal   SpO2: 97%   Weight: 83.6 kg (184 lb 6.4 oz)   Height: 162.6 cm (64.02\")     Body mass index is 31.64 kg/m².    Wt Readings from Last 3 Encounters:   02/03/25 83.6 kg " (184 lb 6.4 oz)   08/02/24 82.4 kg (181 lb 9.6 oz)   01/31/24 88.5 kg (195 lb 3.2 oz)     BP Readings from Last 3 Encounters:   02/03/25 154/82   08/02/24 132/74   01/31/24 142/78       Health Maintenance   Topic Date Due    Pneumococcal Vaccine 0-64 (1 of 2 - PCV) Never done    ZOSTER VACCINE (1 of 2) Never done    TDAP/TD VACCINES (2 - Tdap) 07/27/2014    COVID-19 Vaccine (1 - 2024-25 season) Never done    ANNUAL PHYSICAL  01/31/2025    INFLUENZA VACCINE  03/31/2025 (Originally 7/1/2024)    COLORECTAL CANCER SCREENING  10/23/2025    BMI FOLLOWUP  02/03/2026    HEPATITIS C SCREENING  Completed       Physical Exam  Vitals reviewed.   Constitutional:       Appearance: Normal appearance. He is well-developed.   HENT:      Head: Normocephalic and atraumatic.      Right Ear: External ear normal.      Left Ear: External ear normal.   Eyes:      Conjunctiva/sclera: Conjunctivae normal.      Pupils: Pupils are equal, round, and reactive to light.   Cardiovascular:      Rate and Rhythm: Normal rate and regular rhythm.      Heart sounds: No murmur heard.     No friction rub. No gallop.   Pulmonary:      Effort: Pulmonary effort is normal.      Breath sounds: Normal breath sounds. No wheezing or rhonchi.      Comments: Slight wheeze  Skin:     General: Skin is warm and dry.   Neurological:      Mental Status: He is alert and oriented to person, place, and time.   Psychiatric:         Mood and Affect: Affect normal.         Behavior: Behavior normal.         Thought Content: Thought content normal.        Physical Exam        Result Review :  The following data was reviewed by: Jennifer Lyon MD on 02/03/2025:         Results             Procedures            Assessment & Plan  Primary hypertension      Orders:    Comprehensive Metabolic Panel    CBC & Differential    Lipid Panel    Hypothyroidism due to Hashimoto thyroiditis    Orders:    TSH    T4, Free    Annual physical exam    Orders:    Lipid Panel    Screening,  lipid    Orders:    Lipid Panel    Screening PSA (prostate specific antigen)    Orders:    PSA SCREENING; Future    PSA SCREENING    Encounter for screening colonoscopy    Orders:    Ambulatory Referral to General Surgery    Adrenal mass              Assessment & Plan  Hypertension  - Blood pressure readings consistently elevated, increasing risk for myocardial infarction and cerebrovascular accident  - Previous labs indicated slight hyponatremia, potentially due to the diuretic component of telmisartan HCTZ  - Advised to maintain an active lifestyle, engage in regular physical activity, and monitor dietary sodium intake  - he doesn't want to start another pill so will switch from telmisartan/HCTZ to telmisartan/amlodipine  - Ordered comprehensive blood panel to assess renal function    Stress  - Experiencing significant stress due to family issues and recent passing of his mother  - Advised to consider stress management techniques and stay active  He is doing well without meds right now  - Further interventions may be considered if stress continues     Pinched nerve  - Reports occasional leg discomfort attributed to overexertion  - Diagnosed with a pinched nerve  - Seeking a prescription for ibuprofen, preferring it over OTC medications    Adrenal mass  -doing well, cont to work with Dr. Dewey for management    Health maintenance  - Due for a tetanus vaccine but declines it  - Plans to schedule another colonoscopy soon    Patient or patient representative verbalized consent for the use of Ambient Listening during the visit with  Jennifer Lyon MD for chart documentation. 2/3/2025  08:27 EST      FOLLOW UP  Return in about 6 months (around 8/3/2025).  Patient was given instructions and counseling regarding his condition or for health maintenance advice. Please see specific information pulled into the AVS if appropriate.     Jennifer Lyon MD  02/03/25  08:50 EST    CURRENT & DISCONTINUED  MEDICATIONS  Current Outpatient Medications   Medication Instructions    ibuprofen (ADVIL,MOTRIN) 800 mg, Oral, Every 6 Hours PRN    levothyroxine (SYNTHROID, LEVOTHROID) 88 mcg, Oral, Daily    telmisartan-amLODIPine (TWYNSTA) 80-5 MG per tablet 1 tablet, Oral, Daily       Medications Discontinued During This Encounter   Medication Reason    ibuprofen (ADVIL,MOTRIN) 800 MG tablet Reorder    telmisartan-hydrochlorothiazide (MICARDIS HCT) 80-12.5 MG per tablet

## 2025-02-04 ENCOUNTER — TELEPHONE (OUTPATIENT)
Dept: INTERNAL MEDICINE | Facility: CLINIC | Age: 60
End: 2025-02-04
Payer: COMMERCIAL

## 2025-02-04 NOTE — TELEPHONE ENCOUNTER
Caller: Edgar Owens    Relationship: Self    Best call back number: [phone], CAN LEAVE VOICEMAIL IF UNAVAILABLE     Which medication are you concerned about: BLOOD PRESSURE MEDICATIONS     Who prescribed you this medication: MIHAELA     What are your concerns: PATIENT IS CHECKING WITH MIHAELA'S ASSISTANT REGARDING MEDICATIONS FOR BLOOD PRESSURE AS PHARMACY DID NOT RECEIVE THEM.

## 2025-02-04 NOTE — TELEPHONE ENCOUNTER
It is a different pill and I prefer he take the new one.  Can we call the pharmacy and see if they will get it or see if he is willing to try a different pharmacy?

## 2025-02-05 NOTE — TELEPHONE ENCOUNTER
Called and spoke with [facility] pharmacy, they stated they are able to order the medication and have it in tomorrow, pharmacist did state the medication was 94 dollars for a 90 day supply, pharmacist said this is because of pt's deductible, I attempted to contact pt to let him know  PCP is wanting him on the new medication and that [facility] can get it in stock but pt did not answer, left voicemail for a return call to the office.     None known

## 2025-02-05 NOTE — TELEPHONE ENCOUNTER
Hub staff attempted to follow warm transfer process and was unsuccessful     Caller: Edgar Owens    Relationship to patient: Self    Best call back number: [phone]     Patient is needing: PATIENT RETURNING CALL.

## 2025-02-05 NOTE — TELEPHONE ENCOUNTER
Called and spoke with pt, explained to pt everything I had found out from the pharmacy, explained to pt pharmacy stated they will go ahead and order pt medication and it should be in tomorrow, pt verbalized understanding and had no further questions at this time.

## 2025-03-01 DIAGNOSIS — E06.3 HYPOTHYROIDISM DUE TO HASHIMOTO'S THYROIDITIS: ICD-10-CM

## 2025-03-03 RX ORDER — LEVOTHYROXINE SODIUM 88 UG/1
88 TABLET ORAL DAILY
Qty: 30 TABLET | Refills: 0 | Status: SHIPPED | OUTPATIENT
Start: 2025-03-03

## 2025-04-03 DIAGNOSIS — E06.3 HYPOTHYROIDISM DUE TO HASHIMOTO'S THYROIDITIS: ICD-10-CM

## 2025-04-03 RX ORDER — LEVOTHYROXINE SODIUM 88 UG/1
88 TABLET ORAL DAILY
Qty: 30 TABLET | Refills: 0 | Status: SHIPPED | OUTPATIENT
Start: 2025-04-03

## 2025-05-01 DIAGNOSIS — E06.3 HYPOTHYROIDISM DUE TO HASHIMOTO'S THYROIDITIS: ICD-10-CM

## 2025-05-01 RX ORDER — LEVOTHYROXINE SODIUM 88 UG/1
88 TABLET ORAL DAILY
Qty: 30 TABLET | Refills: 0 | Status: SHIPPED | OUTPATIENT
Start: 2025-05-01

## 2025-06-05 DIAGNOSIS — E06.3 HYPOTHYROIDISM DUE TO HASHIMOTO'S THYROIDITIS: ICD-10-CM

## 2025-06-05 RX ORDER — LEVOTHYROXINE SODIUM 88 UG/1
88 TABLET ORAL DAILY
Qty: 30 TABLET | Refills: 0 | Status: SHIPPED | OUTPATIENT
Start: 2025-06-05

## 2025-07-03 DIAGNOSIS — E06.3 HYPOTHYROIDISM DUE TO HASHIMOTO'S THYROIDITIS: ICD-10-CM

## 2025-07-03 RX ORDER — LEVOTHYROXINE SODIUM 88 UG/1
88 TABLET ORAL DAILY
Qty: 90 TABLET | Refills: 1 | Status: SHIPPED | OUTPATIENT
Start: 2025-07-03

## 2025-07-18 NOTE — PROGRESS NOTES
Chief Complaint    Urologic complaint    Subjective          Edgar Owens presents to [facility] UROLOGY  History of Present Illness       59-year-old  male       Right adrenal adenoma  status post left adrenalectomy on 3/24/16.  Patient did have bilateral adrenal masses.            Dealing with cataracts -   patient is willing to have surgery a week from Thursday.            Voiding ok.  No BPH Meds      No cardiopulmonary history.  Patient has smoked about 1 pack per day for many years.    Patient does not use blood thinner.            PRevious      5/23/22 CT abdomen with and without - slight interval increase in the right adrenal adenoma 3.5 cm.  2.9 in 2019    Patient did have a bad experience with the IV contrast.  Made him very very funny.  Not sure he would want another CT scan with contrast    1/22   0.9, 80    6/19 plasma metanephrines and low-dose dexamethasone test within normal limits    6/19 CT abdomen with/without - right adrenal mass 3 x 2.2 cm.  Previously 2.7 x 1.9.  Status post left adrenalectomy.  Tiny nonobstructing 2 mm calculus upper pole right kidney.      12/16 CT abdomen/pelvis with and without - stable right adrenal adenoma.  2.11.3 cm.    3/16 left adrenalectomy  path -  showed adrenal cortical adenoma, size 5.0 x 4.5 x 3.3 cm    2/16  MRI with and without contrast - Bilateral adrenal masses, largest is 4.8 cm on the left.  On 1.7 right adrenal adenoma.  Findings are consistent with adenoma.    He did have a negative adrenal workup before surgery.    Has a Titanium plate in the head        PSA     11/21  1.1       10/15  0.4         Past History:  Medical History: has a past medical history of Hypertension, Hypothyroidism, Reflux esophagitis, and Right eye trauma.   Surgical History: has a past surgical history that includes Adrenalectomy and Hemorrhoid surgery.   Family History: family history includes Colon cancer in his mother; Diabetes in his father and  mother; Prostate cancer in his maternal uncle.   Social History: reports that he has been smoking cigarettes. He started smoking about 42 years ago. He has a 10.6 pack-year smoking history. He has never used smokeless tobacco. He reports current alcohol use. He reports that he does not use drugs.  Allergies: Patient has no known allergies.       Objective     Vital Signs:   There were no vitals taken for this visit.             Assessment and Plan    Diagnoses and all orders for this visit:    1. Adrenal mass (Primary)      Has not yet done imaging       Has not had any follow-up on his adrenal lesion for a few years.  We discussed this    After discussion we will have him follow-up in 12/25 with CT abdomen with without contrast renal mass protocol.  Patient will be retiring and we can get this done at that point to take a better look.  Will have time get to get this done at that point.      F/u after imaging

## 2025-07-21 ENCOUNTER — OFFICE VISIT (OUTPATIENT)
Dept: UROLOGY | Age: 60
End: 2025-07-21
Payer: COMMERCIAL

## 2025-07-21 VITALS — HEIGHT: 64 IN | BODY MASS INDEX: 31.64 KG/M2

## 2025-07-21 DIAGNOSIS — E27.8 ADRENAL MASS: Primary | ICD-10-CM

## 2025-08-01 RX ORDER — TELMISARTAN AND AMLODIPINE 5; 80 MG/1; MG/1
1 TABLET ORAL DAILY
Qty: 30 TABLET | Refills: 3 | Status: SHIPPED | OUTPATIENT
Start: 2025-08-01 | End: 2025-08-04

## 2025-08-04 ENCOUNTER — OFFICE VISIT (OUTPATIENT)
Dept: INTERNAL MEDICINE | Facility: CLINIC | Age: 60
End: 2025-08-04
Payer: COMMERCIAL

## 2025-08-04 VITALS
BODY MASS INDEX: 30.9 KG/M2 | SYSTOLIC BLOOD PRESSURE: 138 MMHG | HEART RATE: 80 BPM | WEIGHT: 181 LBS | TEMPERATURE: 97.8 F | DIASTOLIC BLOOD PRESSURE: 80 MMHG | HEIGHT: 64 IN | OXYGEN SATURATION: 99 % | RESPIRATION RATE: 18 BRPM

## 2025-08-04 DIAGNOSIS — E06.3 HYPOTHYROIDISM DUE TO HASHIMOTO THYROIDITIS: ICD-10-CM

## 2025-08-04 DIAGNOSIS — I10 PRIMARY HYPERTENSION: ICD-10-CM

## 2025-08-04 DIAGNOSIS — E27.8 ADRENAL MASS: ICD-10-CM

## 2025-08-04 DIAGNOSIS — H26.9 CATARACT OF LEFT EYE, UNSPECIFIED CATARACT TYPE: Primary | ICD-10-CM

## 2025-08-04 LAB
ALBUMIN SERPL-MCNC: 4.1 G/DL (ref 3.5–5.2)
ALBUMIN/GLOB SERPL: 1.3 G/DL
ALP SERPL-CCNC: 57 U/L (ref 39–117)
ALT SERPL W P-5'-P-CCNC: 12 U/L (ref 1–41)
ANION GAP SERPL CALCULATED.3IONS-SCNC: 9 MMOL/L (ref 5–15)
AST SERPL-CCNC: 15 U/L (ref 1–40)
BASOPHILS # BLD AUTO: 0.06 10*3/MM3 (ref 0–0.2)
BASOPHILS NFR BLD AUTO: 0.8 % (ref 0–1.5)
BILIRUB SERPL-MCNC: 0.5 MG/DL (ref 0–1.2)
BUN SERPL-MCNC: 14 MG/DL (ref 8–23)
BUN/CREAT SERPL: 17.1 (ref 7–25)
CALCIUM SPEC-SCNC: 9.2 MG/DL (ref 8.6–10.5)
CHLORIDE SERPL-SCNC: 103 MMOL/L (ref 98–107)
CHOLEST SERPL-MCNC: 165 MG/DL (ref 0–200)
CO2 SERPL-SCNC: 24 MMOL/L (ref 22–29)
CREAT SERPL-MCNC: 0.82 MG/DL (ref 0.76–1.27)
DEPRECATED RDW RBC AUTO: 41.1 FL (ref 37–54)
EGFRCR SERPLBLD CKD-EPI 2021: 99.9 ML/MIN/1.73
EOSINOPHIL # BLD AUTO: 0.17 10*3/MM3 (ref 0–0.4)
EOSINOPHIL NFR BLD AUTO: 2.3 % (ref 0.3–6.2)
ERYTHROCYTE [DISTWIDTH] IN BLOOD BY AUTOMATED COUNT: 11.9 % (ref 12.3–15.4)
GLOBULIN UR ELPH-MCNC: 3.2 GM/DL
GLUCOSE SERPL-MCNC: 111 MG/DL (ref 65–99)
HCT VFR BLD AUTO: 40.7 % (ref 37.5–51)
HDLC SERPL-MCNC: 44 MG/DL (ref 40–60)
HGB BLD-MCNC: 14.2 G/DL (ref 13–17.7)
IMM GRANULOCYTES # BLD AUTO: 0.04 10*3/MM3 (ref 0–0.05)
IMM GRANULOCYTES NFR BLD AUTO: 0.5 % (ref 0–0.5)
LDLC SERPL CALC-MCNC: 108 MG/DL (ref 0–100)
LDLC/HDLC SERPL: 2.45 {RATIO}
LYMPHOCYTES # BLD AUTO: 1.2 10*3/MM3 (ref 0.7–3.1)
LYMPHOCYTES NFR BLD AUTO: 16 % (ref 19.6–45.3)
MCH RBC QN AUTO: 33.4 PG (ref 26.6–33)
MCHC RBC AUTO-ENTMCNC: 34.9 G/DL (ref 31.5–35.7)
MCV RBC AUTO: 95.8 FL (ref 79–97)
MONOCYTES # BLD AUTO: 0.6 10*3/MM3 (ref 0.1–0.9)
MONOCYTES NFR BLD AUTO: 8 % (ref 5–12)
NEUTROPHILS NFR BLD AUTO: 5.42 10*3/MM3 (ref 1.7–7)
NEUTROPHILS NFR BLD AUTO: 72.4 % (ref 42.7–76)
NRBC BLD AUTO-RTO: 0 /100 WBC (ref 0–0.2)
PLATELET # BLD AUTO: 234 10*3/MM3 (ref 140–450)
PMV BLD AUTO: 10.7 FL (ref 6–12)
POTASSIUM SERPL-SCNC: 4.5 MMOL/L (ref 3.5–5.2)
PROT SERPL-MCNC: 7.3 G/DL (ref 6–8.5)
RBC # BLD AUTO: 4.25 10*6/MM3 (ref 4.14–5.8)
SODIUM SERPL-SCNC: 136 MMOL/L (ref 136–145)
TRIGL SERPL-MCNC: 65 MG/DL (ref 0–150)
TSH SERPL DL<=0.05 MIU/L-ACNC: 3.71 UIU/ML (ref 0.27–4.2)
VLDLC SERPL-MCNC: 13 MG/DL (ref 5–40)
WBC NRBC COR # BLD AUTO: 7.49 10*3/MM3 (ref 3.4–10.8)

## 2025-08-04 PROCEDURE — 80053 COMPREHEN METABOLIC PANEL: CPT | Performed by: INTERNAL MEDICINE

## 2025-08-04 PROCEDURE — 99214 OFFICE O/P EST MOD 30 MIN: CPT | Performed by: INTERNAL MEDICINE

## 2025-08-04 PROCEDURE — 85025 COMPLETE CBC W/AUTO DIFF WBC: CPT | Performed by: INTERNAL MEDICINE

## 2025-08-04 PROCEDURE — 84443 ASSAY THYROID STIM HORMONE: CPT | Performed by: INTERNAL MEDICINE

## 2025-08-04 PROCEDURE — 36415 COLL VENOUS BLD VENIPUNCTURE: CPT | Performed by: INTERNAL MEDICINE

## 2025-08-04 PROCEDURE — 80061 LIPID PANEL: CPT | Performed by: INTERNAL MEDICINE

## 2025-08-04 RX ORDER — AMLODIPINE BESYLATE 5 MG/1
5 TABLET ORAL DAILY
Qty: 90 TABLET | Refills: 1 | Status: SHIPPED | OUTPATIENT
Start: 2025-08-04

## 2025-08-04 RX ORDER — TELMISARTAN AND AMLODIPINE 5; 80 MG/1; MG/1
1 TABLET ORAL DAILY
Qty: 30 TABLET | Refills: 3 | OUTPATIENT
Start: 2025-08-04

## 2025-08-04 RX ORDER — TELMISARTAN 80 MG/1
80 TABLET ORAL DAILY
Qty: 90 TABLET | Refills: 1 | Status: SHIPPED | OUTPATIENT
Start: 2025-08-04